# Patient Record
Sex: FEMALE | Race: WHITE | Employment: FULL TIME | URBAN - METROPOLITAN AREA
[De-identification: names, ages, dates, MRNs, and addresses within clinical notes are randomized per-mention and may not be internally consistent; named-entity substitution may affect disease eponyms.]

---
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
## 2020-12-08 ENCOUNTER — HOSPITAL ENCOUNTER (OUTPATIENT)
Age: 20
Setting detail: SPECIMEN
Discharge: HOME | End: 2020-12-08
Payer: COMMERCIAL

## 2020-12-10 LAB
CULTURE: ABNORMAL
Lab: ABNORMAL
SPECIMEN DESCRIPTION: ABNORMAL

## 2021-04-19 ENCOUNTER — TELEPHONE (OUTPATIENT)
Dept: FAMILY MEDICINE CLINIC | Age: 21
End: 2021-04-19

## 2021-04-19 ENCOUNTER — OFFICE VISIT (OUTPATIENT)
Dept: FAMILY MEDICINE CLINIC | Age: 21
End: 2021-04-19
Payer: COMMERCIAL

## 2021-04-19 VITALS
SYSTOLIC BLOOD PRESSURE: 100 MMHG | BODY MASS INDEX: 21.26 KG/M2 | OXYGEN SATURATION: 99 % | HEIGHT: 61 IN | RESPIRATION RATE: 16 BRPM | HEART RATE: 87 BPM | TEMPERATURE: 97.7 F | WEIGHT: 112.6 LBS | DIASTOLIC BLOOD PRESSURE: 60 MMHG

## 2021-04-19 DIAGNOSIS — O21.9 NAUSEA AND VOMITING DURING PREGNANCY: ICD-10-CM

## 2021-04-19 DIAGNOSIS — F41.9 ANXIETY: ICD-10-CM

## 2021-04-19 DIAGNOSIS — O21.9 NAUSEA AND VOMITING DURING PREGNANCY: Primary | ICD-10-CM

## 2021-04-19 DIAGNOSIS — Z3A.01 LESS THAN 8 WEEKS GESTATION OF PREGNANCY: ICD-10-CM

## 2021-04-19 PROCEDURE — G8420 CALC BMI NORM PARAMETERS: HCPCS | Performed by: NURSE PRACTITIONER

## 2021-04-19 PROCEDURE — 1036F TOBACCO NON-USER: CPT | Performed by: NURSE PRACTITIONER

## 2021-04-19 PROCEDURE — G8427 DOCREV CUR MEDS BY ELIG CLIN: HCPCS | Performed by: NURSE PRACTITIONER

## 2021-04-19 PROCEDURE — 99202 OFFICE O/P NEW SF 15 MIN: CPT | Performed by: NURSE PRACTITIONER

## 2021-04-19 PROCEDURE — 99204 OFFICE O/P NEW MOD 45 MIN: CPT | Performed by: NURSE PRACTITIONER

## 2021-04-19 RX ORDER — NITROFURANTOIN 25; 75 MG/1; MG/1
CAPSULE ORAL
COMMUNITY
Start: 2021-04-09

## 2021-04-19 RX ORDER — PNV,CALCIUM 72/IRON/FOLIC ACID 27 MG-1 MG
TABLET ORAL
COMMUNITY
End: 2022-07-25 | Stop reason: ALTCHOICE

## 2021-04-19 RX ORDER — DOXYLAMINE SUCCINATE AND PYRIDOXINE HYDROCHLORIDE, DELAYED RELEASE TABLETS 10 MG/10 MG 10; 10 MG/1; MG/1
TABLET, DELAYED RELEASE ORAL
Qty: 60 TABLET | Refills: 3 | Status: SHIPPED | OUTPATIENT
Start: 2021-04-19 | End: 2021-04-19 | Stop reason: DRUGHIGH

## 2021-04-19 RX ORDER — ONDANSETRON 4 MG/1
4 TABLET, ORALLY DISINTEGRATING ORAL EVERY 8 HOURS PRN
COMMUNITY
End: 2022-07-25 | Stop reason: ALTCHOICE

## 2021-04-19 RX ORDER — DOXYLAMINE SUCCINATE AND PYRIDOXINE HYDROCHLORIDE, DELAYED RELEASE TABLETS 10 MG/10 MG 10; 10 MG/1; MG/1
TABLET, DELAYED RELEASE ORAL
Qty: 60 TABLET | Refills: 3 | Status: SHIPPED | OUTPATIENT
Start: 2021-04-19 | End: 2022-07-25

## 2021-04-19 RX ORDER — CITALOPRAM HYDROBROMIDE 20 MG/10ML
10 SOLUTION, ORAL ORAL DAILY
COMMUNITY
End: 2022-07-25 | Stop reason: ALTCHOICE

## 2021-04-19 SDOH — HEALTH STABILITY: MENTAL HEALTH: HOW MANY STANDARD DRINKS CONTAINING ALCOHOL DO YOU HAVE ON A TYPICAL DAY?: NOT ASKED

## 2021-04-19 SDOH — HEALTH STABILITY: MENTAL HEALTH: HOW OFTEN DO YOU HAVE A DRINK CONTAINING ALCOHOL?: NEVER

## 2021-04-19 ASSESSMENT — PATIENT HEALTH QUESTIONNAIRE - PHQ9
SUM OF ALL RESPONSES TO PHQ QUESTIONS 1-9: 1
SUM OF ALL RESPONSES TO PHQ QUESTIONS 1-9: 1
1. LITTLE INTEREST OR PLEASURE IN DOING THINGS: 1

## 2021-04-19 ASSESSMENT — ENCOUNTER SYMPTOMS
SHORTNESS OF BREATH: 0
COUGH: 0
ABDOMINAL PAIN: 1
VOMITING: 1
NAUSEA: 1

## 2021-04-19 NOTE — PROGRESS NOTES
Apolonia Leger (:  2000) is a 21 y.o. female,New patient, here for evaluation of the following chief complaint(s):  Established New Doctor      ASSESSMENT/PLAN:  1. Nausea and vomiting during pregnancy  - Diclegis sent in. If not covered by insurance I gave her OTC information  2. Less than 8 weeks gestation of pregnancy  - Patient will follow up with OB as scheduled. Is severe abdominal pain develops go to ER  3. Anxiety  - Patient would like to wait on medication at this time      Return if symptoms worsen or fail to improve. SUBJECTIVE/OBJECTIVE:  HPI  Patient is here today as a new patient.  She is newly pregnant at 6.5 weeks. She has been to the ER due to hyperemesis x 2. She also went to the ER 21 because she had a temperature of 100 and she could not keep anything down. She has been taking Ondansetron every 6 hours. She states if she does not take it she continues to vomit. She has been trying to use mints as well. She was also given Phenergan at one point but has not been taking it due to concern of becoming addicted to it. She was previously on Celexa for anxiety and depression which she stopped taking when she found out she was pregnant. Patient states that she had not been on it very long. She states currently she is feeling okay. No thoughts of hurting herself or anyone else. She also states she has seasonal allergies. She would previously take Benadryl if needed. She does have some itchy eyes and burning occasionally. She does have a runny nose and slight cough when she needs to clear her throat. Review of Systems   Constitutional: Negative. HENT: Negative. Respiratory: Negative for cough and shortness of breath. Cardiovascular: Negative for chest pain. Gastrointestinal: Positive for abdominal pain, nausea and vomiting. Patient states she has had abdominal pain for the last 2-3 days which is sharp stabbing. It is not constant.   It comes and goes. Patient was told at one ER she has a pregnancy in the uterus and a pregnancy could not be ruled out in a tube (she is not aware which tube). Genitourinary:        Patient states she is 6.5 weeks pregnant. She is scheduled to see Dr. Becca Almonte 4/22/21. She has been to the ER x 3 since finding out she was pregnant. Was told she had a UTI at the ER 10 days ago so she is on Macrobid. She denies any urinary symptoms. Skin: Negative for rash. Neurological: Negative. Psychiatric/Behavioral: Negative for suicidal ideas. The patient is nervous/anxious. Physical Exam  Vitals signs and nursing note reviewed. Constitutional:       Appearance: Normal appearance. She is normal weight. HENT:      Head: Normocephalic and atraumatic. Mouth/Throat:      Pharynx: Oropharynx is clear. Eyes:      Conjunctiva/sclera: Conjunctivae normal.   Cardiovascular:      Rate and Rhythm: Normal rate. Pulmonary:      Effort: Pulmonary effort is normal.      Breath sounds: Normal breath sounds. Abdominal:      General: Abdomen is flat. Bowel sounds are normal.      Palpations: Abdomen is soft. Tenderness: There is abdominal tenderness in the suprapubic area. Neurological:      General: No focal deficit present. Mental Status: She is alert and oriented to person, place, and time. Mental status is at baseline. Psychiatric:         Mood and Affect: Mood normal.         Behavior: Behavior normal.         Thought Content: Thought content normal.         Judgment: Judgment normal.           On this date 4/19/2021 I have spent 35 minutes reviewing previous notes, test results and face to face with the patient discussing the diagnosis and importance of compliance with the treatment plan as well as documenting on the day of the visit. An electronic signature was used to authenticate this note.     --Gina Hull, APRN - CNP

## 2021-04-19 NOTE — PATIENT INSTRUCTIONS
These can increase your chances of quitting for good. · If you are taking iron supplements, ask your doctor if they are necessary. Iron can make nausea worse. · Get lots of rest. Stress and fatigue can make your morning sickness worse. · Ask your doctor about taking prescription medicine, or over-the-counter products such as vitamin B6, doxylamine, or erick, to relieve your symptoms. Your doctor can tell you the doses that are safe for you. · Take your prenatal vitamins at night on a full stomach. When should you call for help? Call 911 anytime you think you may need emergency care. For example, call if:    · You passed out (lost consciousness). Call your doctor now or seek immediate medical care if:    · You are sick to your stomach or cannot drink fluids.     · You have symptoms of dehydration, such as:  ? Dry eyes and a dry mouth. ? Passing only a little urine. ? Feeling thirstier than usual.     · You are not able to keep down your medicine.     · You have pain in your belly or pelvis. Watch closely for changes in your health, and be sure to contact your doctor if:    · You do not get better as expected. Where can you learn more? Go to https://HoverWindpepiceweb.ZoomInfo. org and sign in to your Verimatrix account. Enter M892 in the [employer] box to learn more about \"Managing Morning Sickness: Care Instructions. \"     If you do not have an account, please click on the \"Sign Up Now\" link. Current as of: October 8, 2020               Content Version: 12.8  © 2006-2021 Healthwise, Incorporated. Care instructions adapted under license by [facility] ([facility]). If you have questions about a medical condition or this instruction, always ask your healthcare professional. [place] [address] any warranty or liability for your use of this information.          Patient Education        Suspected Ectopic Pregnancy: Care Instructions  Your Care Instructions     Your doctor thinks you may have an ectopic pregnancy. This means that a fertilized egg has attached to a place outside the uterus. In most of these cases, the egg grows in a fallopian tube. This is also called a tubal pregnancy. In rare cases, the egg grows in an ovary or another place in the belly. An ectopic pregnancy cannot develop normally. It can be painful and very dangerous. In some cases, the ectopic pregnancy ends and the body absorbs it over time. If so, treatment is not needed. Your doctor is sending you home to watch for belly pain or bleeding. Call your doctor right away if you have any new or increased pain or bleeding. If you have other symptoms, such as shoulder pain, dizziness, lightheadedness, or fainting, call your doctor right away. These could be signs of internal bleeding. You also may need to come back for more tests. If an ectopic pregnancy does not end on its own, the only treatment is medicine or surgery to end the pregnancy. An ectopic pregnancy can be very upsetting. But you should not blame yourself. You could not have done anything to prevent it. The doctor has checked you carefully. But problems can develop later. If you notice any problems or new symptoms, get medical treatment right away. Follow-up care is a key part of your treatment and safety. Be sure to make and go to all appointments, and call your doctor if you are having problems. It's also a good idea to know your test results and keep a list of the medicines you take. How can you care for yourself at home? · Rest when you feel tired. You may be more tired than normal for a few weeks. · If you are treated with methotrexate:  ? Your doctor will let you know if you can take over-the-counter pain medicine, such as acetaminophen (Tylenol), ibuprofen (Advil, Motrin), or naproxen (Aleve). Read and follow all instructions on the label. ? Do not take two or more pain medicines at the same time unless the doctor told you to.  Many pain medicines have acetaminophen, which is Tylenol. Too much acetaminophen (Tylenol) can be harmful. ? Do not drink alcohol. ? Do not take vitamins that contain folic acid, such as prenatal vitamins. · Use pads instead of tampons until your doctor says it is okay. · It may help to talk with family, friends, or a counselor if you are having trouble dealing with the loss of your pregnancy. If you feel sad or depressed for longer than 2 weeks, talk to a counselor or your doctor. · Do not have sex until your doctor says it is okay. · Talk with your doctor about any future pregnancy plans. When should you call for help? Call 911 anytime you think you may need emergency care. For example, call if:    · You have sudden, severe pain in your belly or pelvis.     · You passed out (lost consciousness).     · You have severe vaginal bleeding. Call your doctor now or seek immediate medical care if:    · You are dizzy or lightheaded, or you feel like you may faint.     · You have new or increased pain in your belly or pelvis.     · Your vaginal bleeding is getting worse.     · You have increased pain in the vaginal area.     · You have new pain in your shoulder.     · You have a fever. Watch closely for changes in your health, and be sure to contact your doctor if:    · You have new or worse vaginal discharge.     · You do not get better as expected. Where can you learn more? Go to https://chpepiceweb.[email or web address] Digital [email or web address] org and sign in to your Goomeo account. Enter I526 in the [facility] box to learn more about \"Suspected Ectopic Pregnancy: Care Instructions. \"     If you do not have an account, please click on the \"Sign Up Now\" link. Current as of: October 8, 2020               Content Version: 12.8  © 8939-9571 Healthwise, eBoox. Care instructions adapted under license by [facility] ([facility]).  If you have questions about a medical condition or this instruction, always ask your healthcare professional. Selena Goncalves, Incorporated disclaims any warranty or liability for your use of this information. Patient Education        Learning About Pregnancy  Your Care Instructions     Your health in the early weeks of your pregnancy is particularly important for your baby's health. Take good care of yourself. Anything you do that harms your body can also harm your baby. Make sure to go to all of your doctor appointments. Regular checkups will help keep you and your baby healthy. How can you care for yourself at home? Diet    · Eat a balanced diet. Make sure your diet includes plenty of beans, peas, and leafy green vegetables.     · Do not skip meals or go for many hours without eating. If you are nauseated, try to eat a small, healthy snack every 2 to 3 hours.     · Do not eat fish that has a high level of mercury, such as shark, swordfish, or mackerel. Do not eat more than one can of tuna each week.     · Drink plenty of fluids. If you have kidney, heart, or liver disease and have to limit fluids, talk with your doctor before you increase the amount of fluids you drink.     · Cut down on caffeine, such as coffee, tea, and cola.     · Do not drink alcohol, such as beer, wine, or hard liquor.     · Take a multivitamin that contains at least 400 micrograms (mcg) of folic acid to help prevent birth defects. Fortified cereal and whole wheat bread are good additional sources of folic acid.     · Increase the calcium in your diet. Try to drink a quart of skim milk each day. You may also take calcium supplements and choose foods such as cheese and yogurt. Lifestyle    · Make sure you go to your follow-up appointments.     · Get plenty of rest. You may be unusually tired while you are pregnant.     · Get at least 30 minutes of exercise on most days of the week. Walking is a good choice. If you have not exercised in the past, start out slowly. Take several short walks each day.     · Do not smoke.  If you need help quitting, talk to your doctor about stop-smoking programs. These can increase your chances of quitting for good.     · Do not touch cat feces or litter boxes. Also, wash your hands after you handle raw meat, and fully cook all meat before you eat it. Wear gloves when you work in the yard or garden, and wash your hands well when you are done. Cat feces, raw or undercooked meat, and contaminated dirt can cause an infection that may harm your baby or lead to a miscarriage.     · Do not use saunas or hot tubs. Raising your body temperature may harm your baby.     · Avoid chemical fumes, paint fumes, or poisons.     · Do not use illegal drugs, marijuana, or alcohol. Medicines    · Review all of your medicines with your doctor. Some of your routine medicines may need to be changed to protect your baby.     · Use acetaminophen (Tylenol) to relieve minor problems, such as a mild headache or backache or a mild fever with cold symptoms. Do not use nonsteroidal anti-inflammatory drugs (NSAIDs), such as ibuprofen (Advil, Motrin) or naproxen (Aleve), unless your doctor says it is okay.     · Do not take two or more pain medicines at the same time unless the doctor told you to. Many pain medicines have acetaminophen, which is Tylenol. Too much acetaminophen (Tylenol) can be harmful.     · Take your medicines exactly as prescribed. Call your doctor if you think you are having a problem with your medicine. To manage morning sickness    · If you feel sick when you first wake up, try eating a small snack (such as crackers) before you get out of bed. Allow some time to digest the snack, and then get out of bed slowly.     · Do not skip meals or go for long periods without eating.  An empty stomach can make nausea worse.     · Eat small, frequent meals instead of three large meals each day.     · Drink plenty of fluids.     · Eat foods that are high in protein but low in fat.     · If you are taking iron supplements, ask your doctor if they are necessary. Iron can make nausea worse.     · Avoid any smells, such as coffee, that make you feel sick.     · Get lots of rest. Morning sickness may be worse when you are tired. Follow-up care is a key part of your treatment and safety. Be sure to make and go to all appointments, and call your doctor if you are having problems. It's also a good idea to know your test results and keep a list of the medicines you take. Where can you learn more? Go to https://CloudianpeCazoodle.BakedCode. org and sign in to your [employer] account. Enter S675 in the [employer] box to learn more about \"Learning About Pregnancy. \"     If you do not have an account, please click on the \"Sign Up Now\" link. Current as of: October 8, 2020               Content Version: 12.8  © 6455-5972 HealthAlcove, Incorporated. Care instructions adapted under license by [facility] ([facility]). If you have questions about a medical condition or this instruction, always ask your healthcare professional. [place] [address] any warranty or liability for your use of this information.

## 2021-04-22 LAB
ABO, EXTERNAL RESULT: NORMAL
C. TRACHOMATIS, EXTERNAL RESULT: NEGATIVE
HEP B, EXTERNAL RESULT: NO NREACTIVE
HIV, EXTERNAL RESULT: NON REACTIVE
RPR, EXTERNAL RESULT: NON REACTIVE
RUBELLA TITER, EXTERNAL RESULT: 13.3

## 2021-09-01 ENCOUNTER — INITIAL PRENATAL (OUTPATIENT)
Dept: OBGYN | Age: 21
End: 2021-09-01
Payer: COMMERCIAL

## 2021-09-01 ENCOUNTER — HOSPITAL ENCOUNTER (OUTPATIENT)
Dept: LAB | Age: 21
Discharge: HOME | End: 2021-09-01
Payer: COMMERCIAL

## 2021-09-01 VITALS — HEART RATE: 96 BPM | WEIGHT: 135 LBS | BODY MASS INDEX: 25.51 KG/M2

## 2021-09-01 DIAGNOSIS — Z3A.27 27 WEEKS GESTATION OF PREGNANCY: ICD-10-CM

## 2021-09-01 DIAGNOSIS — Z34.92 PRENATAL CARE, SECOND TRIMESTER: Primary | ICD-10-CM

## 2021-09-01 DIAGNOSIS — Z34.92 PRENATAL CARE, SECOND TRIMESTER: ICD-10-CM

## 2021-09-01 LAB
ABSOLUTE EOS #: 0.09 K/UL (ref 0–0.44)
ABSOLUTE IMMATURE GRANULOCYTE: 0.04 K/UL (ref 0–0.3)
ABSOLUTE LYMPH #: 1.58 K/UL (ref 1.1–3.7)
ABSOLUTE MONO #: 0.54 K/UL (ref 0.1–1.4)
BASOPHILS # BLD: 1 % (ref 0–2)
BASOPHILS ABSOLUTE: 0.05 K/UL (ref 0–0.2)
DIFFERENTIAL TYPE: ABNORMAL
EOSINOPHILS RELATIVE PERCENT: 1 % (ref 1–4)
GLUCOSE ADMINISTRATION: NORMAL
GLUCOSE TOLERANCE SCREEN 50G: 106 MG/DL (ref 70–135)
HCT VFR BLD CALC: 32.3 % (ref 36.3–47.1)
HEMOGLOBIN: 10.7 G/DL (ref 11.9–15.1)
IMMATURE GRANULOCYTES: 0 %
LYMPHOCYTES # BLD: 17 % (ref 25–45)
MCH RBC QN AUTO: 30 PG (ref 25.2–33.5)
MCHC RBC AUTO-ENTMCNC: 33.1 G/DL (ref 25.2–33.5)
MCV RBC AUTO: 90.5 FL (ref 82.6–102.9)
MONOCYTES # BLD: 6 % (ref 2–8)
NRBC AUTOMATED: 0 PER 100 WBC
PDW BLD-RTO: 12.2 % (ref 11.8–14.4)
PLATELET # BLD: 240 K/UL (ref 138–453)
PLATELET ESTIMATE: ABNORMAL
PMV BLD AUTO: 11.5 FL (ref 8.1–13.5)
RBC # BLD: 3.57 M/UL (ref 3.95–5.11)
RBC # BLD: ABNORMAL 10*6/UL
SEG NEUTROPHILS: 75 % (ref 34–64)
SEGMENTED NEUTROPHILS ABSOLUTE COUNT: 6.91 K/UL (ref 1.5–8.1)
WBC # BLD: 9.2 K/UL (ref 4.5–13.5)
WBC # BLD: ABNORMAL 10*3/UL

## 2021-09-01 PROCEDURE — 99214 OFFICE O/P EST MOD 30 MIN: CPT | Performed by: ADVANCED PRACTICE MIDWIFE

## 2021-09-01 PROCEDURE — 85025 COMPLETE CBC W/AUTO DIFF WBC: CPT

## 2021-09-01 PROCEDURE — 36415 COLL VENOUS BLD VENIPUNCTURE: CPT

## 2021-09-01 PROCEDURE — 82950 GLUCOSE TEST: CPT

## 2021-09-01 PROCEDURE — 90715 TDAP VACCINE 7 YRS/> IM: CPT | Performed by: ADVANCED PRACTICE MIDWIFE

## 2021-09-01 RX ORDER — FERROUS SULFATE 325(65) MG
TABLET ORAL
COMMUNITY
Start: 2021-05-06

## 2021-09-01 NOTE — PROGRESS NOTES
S:  Presents as a transfer of care. Reports has not been seen in the past 4 weeks. Desired CNM care. O:  MVSS, Afebrile. Abdomen gravid, cephalic and nontender. +FHT's, +FM. A:  23 yo  at 27 Weeks 0 Days SIUP, Late Transfer of Care, +FHT's  P:  28 week labs, Tdap today, growth US, obtain prenatal records. Ds'd and reviewed diet, hydration, safe meds, danger S&S, when to call. PNV daily. RTO 2 weeks. Thirty minutes spent in education, evaluation and assessment.

## 2021-09-02 ENCOUNTER — HOSPITAL ENCOUNTER (OUTPATIENT)
Dept: ULTRASOUND IMAGING | Age: 21
Discharge: HOME | End: 2021-09-04
Payer: COMMERCIAL

## 2021-09-02 ENCOUNTER — ROUTINE PRENATAL (OUTPATIENT)
Dept: OBGYN | Age: 21
End: 2021-09-02
Payer: COMMERCIAL

## 2021-09-02 ENCOUNTER — HOSPITAL ENCOUNTER (OUTPATIENT)
Age: 21
Setting detail: SPECIMEN
Discharge: HOME | End: 2021-09-02
Payer: COMMERCIAL

## 2021-09-02 VITALS
HEART RATE: 87 BPM | WEIGHT: 136 LBS | DIASTOLIC BLOOD PRESSURE: 60 MMHG | BODY MASS INDEX: 25.7 KG/M2 | SYSTOLIC BLOOD PRESSURE: 110 MMHG

## 2021-09-02 DIAGNOSIS — O99.012 ANEMIA DURING PREGNANCY IN SECOND TRIMESTER: ICD-10-CM

## 2021-09-02 DIAGNOSIS — Z3A.27 27 WEEKS GESTATION OF PREGNANCY: ICD-10-CM

## 2021-09-02 DIAGNOSIS — Z34.92 PRENATAL CARE, SECOND TRIMESTER: ICD-10-CM

## 2021-09-02 DIAGNOSIS — O99.019 ANTEPARTUM ANEMIA: ICD-10-CM

## 2021-09-02 DIAGNOSIS — Z34.92 PRENATAL CARE, SECOND TRIMESTER: Primary | ICD-10-CM

## 2021-09-02 LAB
-: ABNORMAL
AMORPHOUS: ABNORMAL
AMPHETAMINE SCREEN URINE: NEGATIVE
BACTERIA: ABNORMAL
BARBITURATE SCREEN URINE: NEGATIVE
BENZODIAZEPINE SCREEN, URINE: NEGATIVE
BILIRUBIN URINE: NEGATIVE
BUPRENORPHINE URINE: NEGATIVE
CANNABINOID SCREEN URINE: NEGATIVE
CASTS UA: ABNORMAL /LPF (ref 0–2)
COCAINE METABOLITE, URINE: NEGATIVE
COLOR: ABNORMAL
COMMENT UA: ABNORMAL
CRYSTALS, UA: ABNORMAL /HPF
EPITHELIAL CELLS UA: ABNORMAL /HPF (ref 0–5)
GLUCOSE URINE: NEGATIVE
KETONES, URINE: NEGATIVE
LEUKOCYTE ESTERASE, URINE: ABNORMAL
MDMA URINE: NORMAL
METHADONE SCREEN, URINE: NEGATIVE
METHAMPHETAMINE, URINE: NEGATIVE
MUCUS: ABNORMAL
NITRITE, URINE: NEGATIVE
OPIATES, URINE: NEGATIVE
OTHER OBSERVATIONS UA: ABNORMAL
OXYCODONE SCREEN URINE: NEGATIVE
PH UA: 7 (ref 5–6)
PHENCYCLIDINE, URINE: NEGATIVE
PROPOXYPHENE, URINE: NEGATIVE
PROTEIN UA: NEGATIVE
RBC UA: ABNORMAL /HPF (ref 0–4)
RENAL EPITHELIAL, UA: ABNORMAL /HPF
SPECIFIC GRAVITY UA: 1.01 (ref 1.01–1.02)
TEST INFORMATION: NORMAL
TRICHOMONAS: ABNORMAL
TRICYCLIC ANTIDEPRESSANTS, UR: NEGATIVE
TURBIDITY: ABNORMAL
URINE HGB: NEGATIVE
UROBILINOGEN, URINE: NORMAL
WBC UA: ABNORMAL /HPF (ref 0–4)
YEAST: ABNORMAL

## 2021-09-02 PROCEDURE — 1036F TOBACCO NON-USER: CPT | Performed by: ADVANCED PRACTICE MIDWIFE

## 2021-09-02 PROCEDURE — 99213 OFFICE O/P EST LOW 20 MIN: CPT | Performed by: ADVANCED PRACTICE MIDWIFE

## 2021-09-02 PROCEDURE — 80306 DRUG TEST PRSMV INSTRMNT: CPT

## 2021-09-02 PROCEDURE — 76805 OB US >/= 14 WKS SNGL FETUS: CPT

## 2021-09-02 PROCEDURE — G8419 CALC BMI OUT NRM PARAM NOF/U: HCPCS | Performed by: ADVANCED PRACTICE MIDWIFE

## 2021-09-02 PROCEDURE — 87086 URINE CULTURE/COLONY COUNT: CPT

## 2021-09-02 PROCEDURE — 99211 OFF/OP EST MAY X REQ PHY/QHP: CPT | Performed by: ADVANCED PRACTICE MIDWIFE

## 2021-09-02 PROCEDURE — G8427 DOCREV CUR MEDS BY ELIG CLIN: HCPCS | Performed by: ADVANCED PRACTICE MIDWIFE

## 2021-09-02 PROCEDURE — 81001 URINALYSIS AUTO W/SCOPE: CPT

## 2021-09-02 RX ORDER — ASCORBIC ACID 250 MG
250 TABLET ORAL NIGHTLY
Qty: 30 TABLET | Refills: 2 | Status: SHIPPED | OUTPATIENT
Start: 2021-09-02 | End: 2022-02-28

## 2021-09-03 LAB
CULTURE: NO GROWTH
Lab: NORMAL
SPECIMEN DESCRIPTION: NORMAL

## 2021-09-16 ENCOUNTER — ROUTINE PRENATAL (OUTPATIENT)
Dept: OBGYN | Age: 21
End: 2021-09-16
Payer: COMMERCIAL

## 2021-09-16 VITALS
BODY MASS INDEX: 26 KG/M2 | WEIGHT: 137.6 LBS | SYSTOLIC BLOOD PRESSURE: 108 MMHG | DIASTOLIC BLOOD PRESSURE: 58 MMHG | HEART RATE: 82 BPM

## 2021-09-16 DIAGNOSIS — Z34.93 THIRD TRIMESTER PREGNANCY: Primary | ICD-10-CM

## 2021-09-16 DIAGNOSIS — Z3A.29 29 WEEKS GESTATION OF PREGNANCY: ICD-10-CM

## 2021-09-16 DIAGNOSIS — O99.012 ANEMIA DURING PREGNANCY IN SECOND TRIMESTER: ICD-10-CM

## 2021-09-16 PROCEDURE — 99214 OFFICE O/P EST MOD 30 MIN: CPT | Performed by: ADVANCED PRACTICE MIDWIFE

## 2021-09-16 PROCEDURE — G8419 CALC BMI OUT NRM PARAM NOF/U: HCPCS | Performed by: ADVANCED PRACTICE MIDWIFE

## 2021-09-16 PROCEDURE — G8427 DOCREV CUR MEDS BY ELIG CLIN: HCPCS | Performed by: ADVANCED PRACTICE MIDWIFE

## 2021-09-16 PROCEDURE — 1036F TOBACCO NON-USER: CPT | Performed by: ADVANCED PRACTICE MIDWIFE

## 2021-09-16 NOTE — PROGRESS NOTES
S:  Presents for prenatal visit today. Reports baby is active and occasional BH contractions. Reports feeling well. O:  MVSS, Afebrile. Abdomen gravid, cephalic and nontender. +FHT's, +FM  A:  23 yo  at 29 Weeks 1 Day SIUP, +FHT's  P:  Education: discussed FMI, PTL, hydration, danger S&S, when to call. RTO 2 weeks. Thirty minutes spent in education, evaluation and assessment.

## 2021-09-21 ENCOUNTER — TELEPHONE (OUTPATIENT)
Dept: OBGYN | Age: 21
End: 2021-09-21

## 2021-09-21 NOTE — TELEPHONE ENCOUNTER
Patient called reporting she tested positive for covid yesterday at [employer]    Monoclonal antibody- her boss told her she qualifies for this- please advise    Patient was given vitamin recommendations of  Vitamin C 500mg daily  B complex daily  Vitamin D3 2000IU daily    Patient encouraged to stay well hydrated and to monitor symptoms- any SOB or worsening sx report to care facility for treatment

## 2021-09-22 ENCOUNTER — TELEPHONE (OUTPATIENT)
Dept: OBGYN | Age: 21
End: 2021-09-22

## 2021-09-22 NOTE — TELEPHONE ENCOUNTER
Patient notified of scheduled time and date of Regeneron infusion. Patient scheduled for 2:00 pm on 09/23/2021 at ER. Advised to come to ER and wait in car. Upon arrival call # [phone]. Advised to wait in a car until a nurse comes out to get her. Fact Sheet for Emergency use of Casirivimab and Imdevimab read to the patient. Patient denied questions regarding treatment and still consents to treatment. Patient advised to plan to be at facility for at least 2 hours day of the infusion. Patient voiced understanding.  Form faxed to [facility] pharmacy, and U

## 2021-09-23 ENCOUNTER — HOSPITAL ENCOUNTER (OUTPATIENT)
Dept: INFUSION THERAPY | Age: 21
Setting detail: INFUSION SERIES
Discharge: HOME | End: 2021-09-23
Payer: COMMERCIAL

## 2021-09-23 VITALS
DIASTOLIC BLOOD PRESSURE: 49 MMHG | HEART RATE: 84 BPM | SYSTOLIC BLOOD PRESSURE: 110 MMHG | OXYGEN SATURATION: 97 % | RESPIRATION RATE: 14 BRPM | TEMPERATURE: 98.3 F

## 2021-09-23 DIAGNOSIS — O98.513 COVID-19 AFFECTING PREGNANCY IN THIRD TRIMESTER: Primary | ICD-10-CM

## 2021-09-23 DIAGNOSIS — U07.1 COVID-19 AFFECTING PREGNANCY IN THIRD TRIMESTER: Primary | ICD-10-CM

## 2021-09-23 PROCEDURE — 2580000003 HC RX 258: Performed by: ADVANCED PRACTICE MIDWIFE

## 2021-09-23 PROCEDURE — 6360000002 HC RX W HCPCS: Performed by: ADVANCED PRACTICE MIDWIFE

## 2021-09-23 RX ORDER — SODIUM CHLORIDE 9 MG/ML
INJECTION, SOLUTION INTRAVENOUS CONTINUOUS
OUTPATIENT
Start: 2021-09-23

## 2021-09-23 RX ORDER — SODIUM CHLORIDE 9 MG/ML
INJECTION, SOLUTION INTRAVENOUS CONTINUOUS
Status: CANCELLED | OUTPATIENT
Start: 2021-09-23

## 2021-09-23 RX ORDER — EPINEPHRINE 1 MG/ML
0.3 INJECTION, SOLUTION, CONCENTRATE INTRAVENOUS PRN
OUTPATIENT
Start: 2021-09-23

## 2021-09-23 RX ORDER — SODIUM CHLORIDE 0.9 % (FLUSH) 0.9 %
5-40 SYRINGE (ML) INJECTION PRN
OUTPATIENT
Start: 2021-09-23

## 2021-09-23 RX ORDER — SODIUM CHLORIDE 0.9 % (FLUSH) 0.9 %
5-40 SYRINGE (ML) INJECTION PRN
Status: CANCELLED | OUTPATIENT
Start: 2021-09-23

## 2021-09-23 RX ORDER — DIPHENHYDRAMINE HYDROCHLORIDE 50 MG/ML
50 INJECTION INTRAMUSCULAR; INTRAVENOUS ONCE
OUTPATIENT
Start: 2021-09-23 | End: 2021-09-23

## 2021-09-23 RX ORDER — SODIUM CHLORIDE 9 MG/ML
25 INJECTION, SOLUTION INTRAVENOUS PRN
Status: CANCELLED | OUTPATIENT
Start: 2021-09-23

## 2021-09-23 RX ORDER — METHYLPREDNISOLONE SODIUM SUCCINATE 125 MG/2ML
125 INJECTION, POWDER, LYOPHILIZED, FOR SOLUTION INTRAMUSCULAR; INTRAVENOUS ONCE
OUTPATIENT
Start: 2021-09-23 | End: 2021-09-23

## 2021-09-23 RX ORDER — DIPHENHYDRAMINE HYDROCHLORIDE 50 MG/ML
50 INJECTION INTRAMUSCULAR; INTRAVENOUS ONCE
Status: CANCELLED | OUTPATIENT
Start: 2021-09-23 | End: 2021-09-23

## 2021-09-23 RX ORDER — EPINEPHRINE 1 MG/ML
0.3 INJECTION, SOLUTION, CONCENTRATE INTRAVENOUS PRN
Status: CANCELLED | OUTPATIENT
Start: 2021-09-23

## 2021-09-23 RX ORDER — SODIUM CHLORIDE 9 MG/ML
25 INJECTION, SOLUTION INTRAVENOUS PRN
OUTPATIENT
Start: 2021-09-23

## 2021-09-23 RX ORDER — HEPARIN SODIUM (PORCINE) LOCK FLUSH IV SOLN 100 UNIT/ML 100 UNIT/ML
500 SOLUTION INTRAVENOUS PRN
Status: CANCELLED | OUTPATIENT
Start: 2021-09-23

## 2021-09-23 RX ORDER — HEPARIN SODIUM (PORCINE) LOCK FLUSH IV SOLN 100 UNIT/ML 100 UNIT/ML
500 SOLUTION INTRAVENOUS PRN
OUTPATIENT
Start: 2021-09-23

## 2021-09-23 RX ORDER — METHYLPREDNISOLONE SODIUM SUCCINATE 125 MG/2ML
125 INJECTION, POWDER, LYOPHILIZED, FOR SOLUTION INTRAMUSCULAR; INTRAVENOUS ONCE
Status: CANCELLED | OUTPATIENT
Start: 2021-09-23 | End: 2021-09-23

## 2021-09-23 RX ADMIN — CASIRIVIMAB AND IMDEVIMAB: 600; 600 INJECTION, SOLUTION, CONCENTRATE INTRAVENOUS at 14:42

## 2021-10-04 ENCOUNTER — ROUTINE PRENATAL (OUTPATIENT)
Dept: OBGYN | Age: 21
End: 2021-10-04
Payer: COMMERCIAL

## 2021-10-04 VITALS
DIASTOLIC BLOOD PRESSURE: 60 MMHG | WEIGHT: 146.6 LBS | HEART RATE: 102 BPM | BODY MASS INDEX: 27.7 KG/M2 | SYSTOLIC BLOOD PRESSURE: 120 MMHG

## 2021-10-04 DIAGNOSIS — O99.012 ANEMIA DURING PREGNANCY IN SECOND TRIMESTER: ICD-10-CM

## 2021-10-04 DIAGNOSIS — Z3A.31 31 WEEKS GESTATION OF PREGNANCY: ICD-10-CM

## 2021-10-04 DIAGNOSIS — Z34.93 PRENATAL CARE, THIRD TRIMESTER: Primary | ICD-10-CM

## 2021-10-04 PROCEDURE — G8484 FLU IMMUNIZE NO ADMIN: HCPCS | Performed by: ADVANCED PRACTICE MIDWIFE

## 2021-10-04 PROCEDURE — G8419 CALC BMI OUT NRM PARAM NOF/U: HCPCS | Performed by: ADVANCED PRACTICE MIDWIFE

## 2021-10-04 PROCEDURE — 99214 OFFICE O/P EST MOD 30 MIN: CPT | Performed by: ADVANCED PRACTICE MIDWIFE

## 2021-10-04 PROCEDURE — G8427 DOCREV CUR MEDS BY ELIG CLIN: HCPCS | Performed by: ADVANCED PRACTICE MIDWIFE

## 2021-10-04 PROCEDURE — 1036F TOBACCO NON-USER: CPT | Performed by: ADVANCED PRACTICE MIDWIFE

## 2021-10-04 PROCEDURE — 99211 OFF/OP EST MAY X REQ PHY/QHP: CPT | Performed by: ADVANCED PRACTICE MIDWIFE

## 2021-10-04 RX ORDER — ACETAMINOPHEN 160 MG
TABLET,DISINTEGRATING ORAL
COMMUNITY
End: 2022-07-25

## 2021-10-04 NOTE — PROGRESS NOTES
S:  Presents for prenatal visit today. Reports feeling difficulty breathing, started with Covid-19 diagnosis, tested positive on the .  She reports that her breathing has not fully returned to her normal.   O:  MVSS, Afebrile. Abdomen gravid,cephalic and nontender. +FHT's, +FM  A:  25 yo  at 31 Weeks 5 Days SIUP, +FHT's  P:  Education: peds, baby doctor/provider, labor and delivery, pain management, breastfeeding and pump, order for breast pump given, Thirty minutes spent in education, evaluation and assessment. RTO 2 weeks.

## 2021-10-18 ENCOUNTER — HOSPITAL ENCOUNTER (OUTPATIENT)
Dept: ULTRASOUND IMAGING | Age: 21
Discharge: HOME | End: 2021-10-20
Payer: COMMERCIAL

## 2021-10-18 ENCOUNTER — ROUTINE PRENATAL (OUTPATIENT)
Dept: OBGYN | Age: 21
End: 2021-10-18
Payer: COMMERCIAL

## 2021-10-18 VITALS
BODY MASS INDEX: 27.7 KG/M2 | SYSTOLIC BLOOD PRESSURE: 112 MMHG | WEIGHT: 146.6 LBS | HEART RATE: 84 BPM | DIASTOLIC BLOOD PRESSURE: 66 MMHG

## 2021-10-18 DIAGNOSIS — Z3A.33 33 WEEKS GESTATION OF PREGNANCY: ICD-10-CM

## 2021-10-18 DIAGNOSIS — O99.012 ANEMIA DURING PREGNANCY IN SECOND TRIMESTER: ICD-10-CM

## 2021-10-18 DIAGNOSIS — Z23 FLU VACCINE NEED: ICD-10-CM

## 2021-10-18 DIAGNOSIS — Z34.93 PRENATAL CARE, THIRD TRIMESTER: ICD-10-CM

## 2021-10-18 DIAGNOSIS — Z34.93 PRENATAL CARE, THIRD TRIMESTER: Primary | ICD-10-CM

## 2021-10-18 PROCEDURE — G8482 FLU IMMUNIZE ORDER/ADMIN: HCPCS | Performed by: ADVANCED PRACTICE MIDWIFE

## 2021-10-18 PROCEDURE — 1036F TOBACCO NON-USER: CPT | Performed by: ADVANCED PRACTICE MIDWIFE

## 2021-10-18 PROCEDURE — PBSHW INFLUENZA, QUADV, 3 YRS AND OLDER, IM PF, PREFILL SYR OR SDV, 0.5ML (AFLURIA QUADV, PF): Performed by: ADVANCED PRACTICE MIDWIFE

## 2021-10-18 PROCEDURE — 76805 OB US >/= 14 WKS SNGL FETUS: CPT

## 2021-10-18 PROCEDURE — 90686 IIV4 VACC NO PRSV 0.5 ML IM: CPT | Performed by: ADVANCED PRACTICE MIDWIFE

## 2021-10-18 PROCEDURE — G8419 CALC BMI OUT NRM PARAM NOF/U: HCPCS | Performed by: ADVANCED PRACTICE MIDWIFE

## 2021-10-18 PROCEDURE — G8427 DOCREV CUR MEDS BY ELIG CLIN: HCPCS | Performed by: ADVANCED PRACTICE MIDWIFE

## 2021-10-18 PROCEDURE — 99214 OFFICE O/P EST MOD 30 MIN: CPT | Performed by: ADVANCED PRACTICE MIDWIFE

## 2021-10-18 NOTE — PROGRESS NOTES
Have you had an allergic reaction to the flu (influenza) shot? no  Are you allergic to eggs or any component of the flu vaccine? no  Do you have a history of Guillain-Amma Syndrome (GBS), which is paralysis after receiving the flu vaccine? no  Are you feeling well today? yes  Flu vaccine given as ordered. Patient tolerated it well. No questions re: VIS information.

## 2021-10-18 NOTE — PROGRESS NOTES
S:   Presents for presents for prenatal visit today. Reports reduced constipation with high fiber smoothie. No contractions. Baby is active. Desires flu vaccine. O:  MVSS, Afebrile. Abdomen gravid, cephalic and nontender. S=D, +FHT's, +FM. US = 34 Weeks 2 Days = EDC 2021, EFW 2384 gm. +/- 357.65 gm., (5# 4 oz. +/- 13 oz.), 59.2%, CELIO 15.21 cm.,   A:  23 yo  at 33 Weeks 5 Days SIUP, +FHT's, Anemia in Pregnancy  P:  Growth US today, ds'd flu vaccine, Covid-19 vaccine, FMI, danger S&S, when to call. Reviewed US. Thirty minutes spent in education, evaluation and assessment.

## 2021-11-01 ENCOUNTER — ROUTINE PRENATAL (OUTPATIENT)
Dept: OBGYN | Age: 21
End: 2021-11-01
Payer: COMMERCIAL

## 2021-11-01 ENCOUNTER — HOSPITAL ENCOUNTER (OUTPATIENT)
Age: 21
Setting detail: SPECIMEN
Discharge: HOME | End: 2021-11-01
Payer: COMMERCIAL

## 2021-11-01 VITALS
DIASTOLIC BLOOD PRESSURE: 60 MMHG | HEART RATE: 83 BPM | SYSTOLIC BLOOD PRESSURE: 120 MMHG | BODY MASS INDEX: 28.53 KG/M2 | WEIGHT: 151 LBS

## 2021-11-01 DIAGNOSIS — Z3A.35 35 WEEKS GESTATION OF PREGNANCY: ICD-10-CM

## 2021-11-01 DIAGNOSIS — O99.012 ANEMIA DURING PREGNANCY IN SECOND TRIMESTER: ICD-10-CM

## 2021-11-01 DIAGNOSIS — Z34.93 PRENATAL CARE, THIRD TRIMESTER: Primary | ICD-10-CM

## 2021-11-01 PROCEDURE — 99212 OFFICE O/P EST SF 10 MIN: CPT | Performed by: ADVANCED PRACTICE MIDWIFE

## 2021-11-01 PROCEDURE — 87081 CULTURE SCREEN ONLY: CPT

## 2021-11-01 PROCEDURE — G8427 DOCREV CUR MEDS BY ELIG CLIN: HCPCS | Performed by: ADVANCED PRACTICE MIDWIFE

## 2021-11-01 PROCEDURE — 1036F TOBACCO NON-USER: CPT | Performed by: ADVANCED PRACTICE MIDWIFE

## 2021-11-01 PROCEDURE — 99214 OFFICE O/P EST MOD 30 MIN: CPT | Performed by: ADVANCED PRACTICE MIDWIFE

## 2021-11-01 PROCEDURE — G8419 CALC BMI OUT NRM PARAM NOF/U: HCPCS | Performed by: ADVANCED PRACTICE MIDWIFE

## 2021-11-01 PROCEDURE — G8482 FLU IMMUNIZE ORDER/ADMIN: HCPCS | Performed by: ADVANCED PRACTICE MIDWIFE

## 2021-11-01 NOTE — PROGRESS NOTES
S:  Presents for prenatal visit today, Reports continues to deal with constipation. Baby is active. Note occasional contractions. O:  MVSS, Afebrile. Abdomen gravid, nontender. Cephalic and floating. +FHT's, +FM, OP  A:  25 yo  at 35 Weeks 5 Days +FHT's, Anemia Affecting Pregnancy  P:  Education: discussed FMI, labor S&S, when to call, ds'd exercises to rotate baby to OA, genital GBS status. Thirty minutes spent in education, evualtion and assessment.

## 2021-11-04 LAB
CULTURE: NORMAL
Lab: NORMAL
SPECIMEN DESCRIPTION: NORMAL

## 2021-11-08 ENCOUNTER — ROUTINE PRENATAL (OUTPATIENT)
Dept: OBGYN | Age: 21
End: 2021-11-08
Payer: COMMERCIAL

## 2021-11-08 VITALS
WEIGHT: 156 LBS | SYSTOLIC BLOOD PRESSURE: 112 MMHG | DIASTOLIC BLOOD PRESSURE: 64 MMHG | HEART RATE: 98 BPM | BODY MASS INDEX: 29.48 KG/M2

## 2021-11-08 DIAGNOSIS — Z3A.36 36 WEEKS GESTATION OF PREGNANCY: ICD-10-CM

## 2021-11-08 DIAGNOSIS — Z34.93 PRENATAL CARE, THIRD TRIMESTER: Primary | ICD-10-CM

## 2021-11-08 PROCEDURE — G8419 CALC BMI OUT NRM PARAM NOF/U: HCPCS | Performed by: ADVANCED PRACTICE MIDWIFE

## 2021-11-08 PROCEDURE — G8482 FLU IMMUNIZE ORDER/ADMIN: HCPCS | Performed by: ADVANCED PRACTICE MIDWIFE

## 2021-11-08 PROCEDURE — 1036F TOBACCO NON-USER: CPT | Performed by: ADVANCED PRACTICE MIDWIFE

## 2021-11-08 PROCEDURE — 99214 OFFICE O/P EST MOD 30 MIN: CPT | Performed by: ADVANCED PRACTICE MIDWIFE

## 2021-11-08 PROCEDURE — G8427 DOCREV CUR MEDS BY ELIG CLIN: HCPCS | Performed by: ADVANCED PRACTICE MIDWIFE

## 2021-11-08 PROCEDURE — 99212 OFFICE O/P EST SF 10 MIN: CPT | Performed by: ADVANCED PRACTICE MIDWIFE

## 2021-11-08 NOTE — PROGRESS NOTES
S:  Presents for prenatal visit today. Reports baby is active, occasional contractions. URI, watery eyes, runny nose with clear drainage. O:  MVSS, Afebrile. Abdomen gravid, nontender. S=D, +FHT's, +FM, SVE deferred  A:  23 yo  at 36 Weeks 5 Days SIUP, +FHT's  P:  Education: Discussed and reviewed labor S&S, when to call, continue with exercise to keep baby OA, safe meds for seasonal allergies. RTO 1 week. Thirty minutes spent in education, evaluation and assessment.

## 2021-11-15 ENCOUNTER — ROUTINE PRENATAL (OUTPATIENT)
Dept: OBGYN | Age: 21
End: 2021-11-15
Payer: COMMERCIAL

## 2021-11-15 VITALS
SYSTOLIC BLOOD PRESSURE: 120 MMHG | WEIGHT: 158 LBS | BODY MASS INDEX: 29.85 KG/M2 | HEART RATE: 96 BPM | DIASTOLIC BLOOD PRESSURE: 70 MMHG

## 2021-11-15 DIAGNOSIS — Z3A.37 37 WEEKS GESTATION OF PREGNANCY: ICD-10-CM

## 2021-11-15 DIAGNOSIS — Z34.93 PRENATAL CARE, THIRD TRIMESTER: Primary | ICD-10-CM

## 2021-11-15 PROCEDURE — G8482 FLU IMMUNIZE ORDER/ADMIN: HCPCS | Performed by: ADVANCED PRACTICE MIDWIFE

## 2021-11-15 PROCEDURE — G8419 CALC BMI OUT NRM PARAM NOF/U: HCPCS | Performed by: ADVANCED PRACTICE MIDWIFE

## 2021-11-15 PROCEDURE — 99212 OFFICE O/P EST SF 10 MIN: CPT | Performed by: ADVANCED PRACTICE MIDWIFE

## 2021-11-15 PROCEDURE — 99214 OFFICE O/P EST MOD 30 MIN: CPT | Performed by: ADVANCED PRACTICE MIDWIFE

## 2021-11-15 PROCEDURE — 1036F TOBACCO NON-USER: CPT | Performed by: ADVANCED PRACTICE MIDWIFE

## 2021-11-15 PROCEDURE — G8427 DOCREV CUR MEDS BY ELIG CLIN: HCPCS | Performed by: ADVANCED PRACTICE MIDWIFE

## 2021-11-15 RX ORDER — DOCUSATE SODIUM 100 MG/1
100 CAPSULE, LIQUID FILLED ORAL 2 TIMES DAILY
COMMUNITY
End: 2022-07-25 | Stop reason: ALTCHOICE

## 2021-11-15 NOTE — PROGRESS NOTES
S:  Presents for prenatal visit today. Reports baby is active and no uterine contractions. She reports no worries or concerns. O:  MVSS, Afebrile. Abdomen gravid, nontender. S=D, +FHT's, +FM. SVE FT/50%/Ballotable  A:  25 yo  at 37 Weeks 5 Days SIUP, +FHT's  P:  Education: Labor S&S, when to call. Danger S&S, FMI, preparation for baby. Thirty minutes spent in education, evaluation and assessment. RTO 1 week; Juan Garza

## 2021-11-22 ENCOUNTER — ROUTINE PRENATAL (OUTPATIENT)
Dept: OBGYN | Age: 21
End: 2021-11-22
Payer: COMMERCIAL

## 2021-11-22 VITALS
SYSTOLIC BLOOD PRESSURE: 120 MMHG | HEART RATE: 98 BPM | DIASTOLIC BLOOD PRESSURE: 66 MMHG | BODY MASS INDEX: 29.74 KG/M2 | WEIGHT: 157.4 LBS

## 2021-11-22 DIAGNOSIS — O99.012 ANEMIA DURING PREGNANCY IN SECOND TRIMESTER: ICD-10-CM

## 2021-11-22 DIAGNOSIS — Z3A.38 38 WEEKS GESTATION OF PREGNANCY: ICD-10-CM

## 2021-11-22 DIAGNOSIS — Z34.93 PRENATAL CARE, THIRD TRIMESTER: Primary | ICD-10-CM

## 2021-11-22 PROCEDURE — 99213 OFFICE O/P EST LOW 20 MIN: CPT | Performed by: ADVANCED PRACTICE MIDWIFE

## 2021-11-22 PROCEDURE — G8427 DOCREV CUR MEDS BY ELIG CLIN: HCPCS | Performed by: ADVANCED PRACTICE MIDWIFE

## 2021-11-22 PROCEDURE — 99214 OFFICE O/P EST MOD 30 MIN: CPT | Performed by: ADVANCED PRACTICE MIDWIFE

## 2021-11-22 PROCEDURE — 1036F TOBACCO NON-USER: CPT | Performed by: ADVANCED PRACTICE MIDWIFE

## 2021-11-22 PROCEDURE — G8482 FLU IMMUNIZE ORDER/ADMIN: HCPCS | Performed by: ADVANCED PRACTICE MIDWIFE

## 2021-11-22 PROCEDURE — G8419 CALC BMI OUT NRM PARAM NOF/U: HCPCS | Performed by: ADVANCED PRACTICE MIDWIFE

## 2021-11-22 NOTE — PROGRESS NOTES
S:  Presents for prenatal visit today. Reports increasing low pelvic pressure/pain, no contractions and baby is active. O:  MVSS, Afebrile. Abdomen gravid, nontender. +FHT's, +FM. SVE 1 cm/50%/-3, Cervix soft  A:  23 yo  at 38 Weeks 5 Days SIUP, +FHT's  P:  Education: discussed diet, hydration, labor S&S, when to call. RTO 1 week. Thirty minutes spent in education, evaluation and assessment.

## 2021-11-29 LAB
AMPHETAMINE SCREEN, URINE: NEGATIVE
BANDED NEUTROPHILS RELATIVE PERCENT: 2 % (ref 0–5)
BARBITURATE SCREEN, URINE: NEGATIVE
BASOPHILS %. MANUAL COUNT: 0 % (ref 0–1)
BENZODIAZEPINES, URINE SCREEN: NEGATIVE
CANNABINOID SCREEN URINE: NEGATIVE
COCAINE(METAB.)SCREEN, URINE: NEGATIVE
EOSINOPHILS % MANUAL COUNT: 1 (ref 0–10)
HCT VFR BLD CALC: 34 % (ref 37–47)
HEMOGLOBIN: 12.8 (ref 12–16)
LYMPHOCYTES % MANUAL COUNT: 11 % (ref 21–51)
MCH RBC QN AUTO: 30.4 PG (ref 28.5–32.5)
MCHC RBC AUTO-ENTMCNC: 37.6 G/DL (ref 32–37)
MCV RBC AUTO: 80.9 FL (ref 80–94)
MICROCYTOSIS: NORMAL
MONOCYTES RELATIVE PERCENT: 3 % (ref 2–9)
NEUTROPHILS %. MANUAL COUNT: 83 % (ref 42–75)
OPIATE SCREEN, URINE: NEGATIVE
OXYCODONE SCREEN URINE: NEGATIVE
PDW BLD-RTO: 11.1 % (ref 8.5–15.5)
PHENCYCLIDINE SCREEN URINE: NEGATIVE
PLATELET # BLD: 246.9 THOU/MM3 (ref 130–400)
RBC: 4.2 M/UL (ref 4.2–5.4)
SARS-COV-2, RAPID: NEGATIVE
TRICYCLIC ANTIDEPRESSANTS, UR: NEGATIVE
WBC: 19.9 THOU/ML3 (ref 4.8–10.8)

## 2021-11-30 LAB — N. GONORRHOEAE DNA, URINE: NEGATIVE

## 2021-12-14 ENCOUNTER — POSTPARTUM VISIT (OUTPATIENT)
Dept: OBGYN | Age: 21
End: 2021-12-14
Payer: COMMERCIAL

## 2021-12-14 VITALS
HEIGHT: 60 IN | WEIGHT: 133.4 LBS | HEART RATE: 102 BPM | SYSTOLIC BLOOD PRESSURE: 112 MMHG | DIASTOLIC BLOOD PRESSURE: 60 MMHG | BODY MASS INDEX: 26.19 KG/M2 | OXYGEN SATURATION: 98 %

## 2021-12-14 DIAGNOSIS — Z30.011 FAMILY PLANNING, BCP (BIRTH CONTROL PILLS) INITIAL PRESCRIPTION: ICD-10-CM

## 2021-12-14 PROCEDURE — G8419 CALC BMI OUT NRM PARAM NOF/U: HCPCS | Performed by: ADVANCED PRACTICE MIDWIFE

## 2021-12-14 PROCEDURE — G8482 FLU IMMUNIZE ORDER/ADMIN: HCPCS | Performed by: ADVANCED PRACTICE MIDWIFE

## 2021-12-14 PROCEDURE — 99214 OFFICE O/P EST MOD 30 MIN: CPT | Performed by: ADVANCED PRACTICE MIDWIFE

## 2021-12-14 PROCEDURE — G8427 DOCREV CUR MEDS BY ELIG CLIN: HCPCS | Performed by: ADVANCED PRACTICE MIDWIFE

## 2021-12-14 PROCEDURE — 99212 OFFICE O/P EST SF 10 MIN: CPT | Performed by: ADVANCED PRACTICE MIDWIFE

## 2021-12-14 PROCEDURE — 1036F TOBACCO NON-USER: CPT | Performed by: ADVANCED PRACTICE MIDWIFE

## 2021-12-14 RX ORDER — ACETAMINOPHEN AND CODEINE PHOSPHATE 120; 12 MG/5ML; MG/5ML
1 SOLUTION ORAL DAILY
Qty: 28 TABLET | Refills: 5 | Status: SHIPPED | OUTPATIENT
Start: 2021-12-14 | End: 2022-07-25 | Stop reason: ALTCHOICE

## 2021-12-14 RX ORDER — FAMOTIDINE 20 MG
1 TABLET ORAL DAILY
Qty: 30 TABLET | Refills: 12 | Status: SHIPPED | OUTPATIENT
Start: 2021-12-14 | End: 2022-07-25 | Stop reason: ALTCHOICE

## 2021-12-14 ASSESSMENT — ENCOUNTER SYMPTOMS
RESPIRATORY NEGATIVE: 1
GASTROINTESTINAL NEGATIVE: 1
EYES NEGATIVE: 1

## 2021-12-14 NOTE — PROGRESS NOTES
Subjective:      Patient ID: Obed Linares  is a 24 y.o. y.o. female. Sergio Ann presents for two week postpartum visit. She is exclusively breastfeeding her infant daughter. She reports that she is exclusive nursing every 2-3 hours during the daytime and at night she will stretch out to 3-4 hours. She reports her nipples are a little sore. She has no clogged ducts or breast pain. She denies depression or blues. She is having brownish discharge and she changes every 4 hours for hygeine purposes. She is getting about 6 hours of sleep / 24 hours. Review of Systems   Constitutional: Negative. HENT: Negative. Eyes: Negative. Respiratory: Negative. Cardiovascular: Negative. Gastrointestinal: Negative. Genitourinary: Negative. Musculoskeletal: Negative. Skin: Negative. Neurological: Negative. Psychiatric/Behavioral: Negative. Breast ROS: negative, lactating    Objective:   /60 (Site: Right Upper Arm, Position: Sitting, Cuff Size: Small Adult)   Pulse 102   Ht 5' (1.524 m)   Wt 133 lb 6.4 oz (60.5 kg)   LMP  (LMP Unknown)   SpO2 98%   Breastfeeding Yes   BMI 26.05 kg/m²   Physical Exam  Constitutional:       Appearance: She is well-developed. HENT:      Head: Normocephalic and atraumatic. Eyes:      Conjunctiva/sclera: Conjunctivae normal.   Cardiovascular:      Rate and Rhythm: Normal rate and regular rhythm. Heart sounds: Normal heart sounds. Pulmonary:      Effort: Pulmonary effort is normal.      Breath sounds: Normal breath sounds. Chest:   Breasts: Breasts are symmetrical.      Right: No inverted nipple, mass, nipple discharge, skin change or tenderness. Left: No inverted nipple, mass, nipple discharge, skin change or tenderness. Abdominal:      Palpations: Abdomen is soft. Genitourinary:     Vagina: Normal.      Comments: Normal uterine involution. Lochia small rubra. Perineum intact.   Musculoskeletal:         General: Normal range of motion. Cervical back: Normal range of motion and neck supple. Skin:     General: Skin is warm and dry. Neurological:      Mental Status: She is alert and oriented to person, place, and time. Deep Tendon Reflexes: Reflexes are normal and symmetric. Psychiatric:         Mood and Affect: Mood normal.         Thought Content: Thought content normal.             Assessment:      Diagnosis Orders   1. Care and examination of lactating mother  norethindrone (ORTHO MICRONOR) 0.35 MG tablet    Prenatal Vit-Fe Fumarate-FA (PRENATAL COMPLETE) 14-0.4 MG TABS   2. Family planning, BCP (birth control pills) initial prescription             Plan:   Education: discussed diet, hydration, danger S&S, when to call, family planning options, she desires a POP. Ds'd take pill daily and must be lactating at least 3x/24 hours. BUM for first pack of pills to protect against unintended pregnancy. Start the Sunday following infants 4th week of life. Continue with  PNV daily. RTO 4 weeks. Thirty minutes spent in education, evaluation and assessment.

## 2022-01-12 ENCOUNTER — POSTPARTUM VISIT (OUTPATIENT)
Dept: OBGYN | Age: 22
End: 2022-01-12
Payer: COMMERCIAL

## 2022-01-12 ENCOUNTER — IMMUNIZATION (OUTPATIENT)
Dept: LAB | Age: 22
End: 2022-01-12
Payer: COMMERCIAL

## 2022-01-12 VITALS
HEIGHT: 60 IN | BODY MASS INDEX: 24.9 KG/M2 | DIASTOLIC BLOOD PRESSURE: 60 MMHG | SYSTOLIC BLOOD PRESSURE: 112 MMHG | OXYGEN SATURATION: 97 % | HEART RATE: 87 BPM | WEIGHT: 126.8 LBS

## 2022-01-12 DIAGNOSIS — Z30.011 FAMILY PLANNING, BCP (BIRTH CONTROL PILLS) INITIAL PRESCRIPTION: ICD-10-CM

## 2022-01-12 PROCEDURE — 99212 OFFICE O/P EST SF 10 MIN: CPT

## 2022-01-12 PROCEDURE — 1036F TOBACCO NON-USER: CPT | Performed by: ADVANCED PRACTICE MIDWIFE

## 2022-01-12 PROCEDURE — PBSHW COVID-19, MODERNA PRIMARY SERIES VACCINE 100MCG/0.5ML DOSE: Performed by: INTERNAL MEDICINE

## 2022-01-12 PROCEDURE — 91301 COVID-19, MODERNA PRIMARY SERIES VACCINE 100MCG/0.5ML DOSE: CPT | Performed by: INTERNAL MEDICINE

## 2022-01-12 PROCEDURE — G8420 CALC BMI NORM PARAMETERS: HCPCS | Performed by: ADVANCED PRACTICE MIDWIFE

## 2022-01-12 PROCEDURE — G8482 FLU IMMUNIZE ORDER/ADMIN: HCPCS | Performed by: ADVANCED PRACTICE MIDWIFE

## 2022-01-12 PROCEDURE — G8427 DOCREV CUR MEDS BY ELIG CLIN: HCPCS | Performed by: ADVANCED PRACTICE MIDWIFE

## 2022-01-12 ASSESSMENT — ENCOUNTER SYMPTOMS
RESPIRATORY NEGATIVE: 1
GASTROINTESTINAL NEGATIVE: 1
EYES NEGATIVE: 1

## 2022-01-12 NOTE — PROGRESS NOTES
Subjective:      Patient ID: Oren Doanvon  is a 24 y.o. y.o. female. Ninfa Boyce presents today for 6 week postpartum visit . She is exclusively breastfeeding her infant daughter every 3 hours during the daytime and will stretching out to 6 hours at night. She has an occasional sore nipples. No clogged ducts or mastitis. She is having a small amount of red bleeding, no clots, no pain. She denies fever or chills, no depression, no dysuria, no constipation or diarrhea. She has not resumed intercourse. She is using the POP for family planning. Review of Systems   Constitutional: Negative. HENT: Negative. Eyes: Negative. Respiratory: Negative. Cardiovascular: Negative. Gastrointestinal: Negative. Genitourinary: Negative. Musculoskeletal: Negative. Skin: Negative. Neurological: Negative. Psychiatric/Behavioral: Negative. Breast ROS: negative, lactating    Objective:   /60 (Site: Right Upper Arm, Position: Sitting, Cuff Size: Small Adult)   Pulse 87   Ht 5' (1.524 m)   Wt 126 lb 12.8 oz (57.5 kg)   LMP  (LMP Unknown)   SpO2 97%   Breastfeeding Yes   BMI 24.76 kg/m²   Physical Exam  Constitutional:       Appearance: She is well-developed. HENT:      Head: Normocephalic and atraumatic. Eyes:      Conjunctiva/sclera: Conjunctivae normal.   Cardiovascular:      Rate and Rhythm: Normal rate and regular rhythm. Heart sounds: Normal heart sounds. Pulmonary:      Effort: Pulmonary effort is normal.      Breath sounds: Normal breath sounds. Abdominal:      Palpations: Abdomen is soft. Genitourinary:     Vagina: Normal.      Comments: Normal uterine involution. Lochia scant serosa. Musculoskeletal:         General: Normal range of motion. Cervical back: Normal range of motion and neck supple. Skin:     General: Skin is warm and dry. Neurological:      Mental Status: She is alert and oriented to person, place, and time.       Deep Tendon Reflexes: Reflexes are normal and symmetric. Psychiatric:         Mood and Affect: Mood normal.         Thought Content: Thought content normal.       Depression screen = 0. Assessment:      Diagnosis Orders   1. Postpartum care and examination of lactating mother     2. Family planning, BCP (birth control pills) initial prescription             Plan:   Education:  Discussed diet with calcium intake 2191-1449 mg./day, weight bearing exercise 30-50 minutes 3-5x/day and may resume all previous activities. Thirty minutes spent in education, evaluation and assessment.   RTO 6 months for annual.

## 2022-02-09 ENCOUNTER — IMMUNIZATION (OUTPATIENT)
Dept: LAB | Age: 22
End: 2022-02-09
Payer: COMMERCIAL

## 2022-02-09 PROCEDURE — 91301 COVID-19, MODERNA PRIMARY SERIES VACCINE 100MCG/0.5ML DOSE: CPT | Performed by: INTERNAL MEDICINE

## 2022-02-09 PROCEDURE — PBSHW COVID-19, MODERNA PRIMARY SERIES VACCINE 100MCG/0.5ML DOSE: Performed by: INTERNAL MEDICINE

## 2022-02-25 DIAGNOSIS — Z34.92 PRENATAL CARE, SECOND TRIMESTER: ICD-10-CM

## 2022-02-25 DIAGNOSIS — O99.012 ANEMIA DURING PREGNANCY IN SECOND TRIMESTER: ICD-10-CM

## 2022-02-28 RX ORDER — MULTIVIT WITH MINERALS/LUTEIN
TABLET ORAL
Qty: 30 TABLET | Refills: 2 | Status: SHIPPED | OUTPATIENT
Start: 2022-02-28 | End: 2022-07-25 | Stop reason: ALTCHOICE

## 2022-04-04 ENCOUNTER — OFFICE VISIT (OUTPATIENT)
Dept: OBGYN | Age: 22
End: 2022-04-04
Payer: COMMERCIAL

## 2022-04-04 ENCOUNTER — HOSPITAL ENCOUNTER (OUTPATIENT)
Age: 22
Setting detail: SPECIMEN
Discharge: HOME | End: 2022-04-04
Payer: COMMERCIAL

## 2022-04-04 VITALS
SYSTOLIC BLOOD PRESSURE: 100 MMHG | HEART RATE: 90 BPM | DIASTOLIC BLOOD PRESSURE: 60 MMHG | BODY MASS INDEX: 24.9 KG/M2 | HEIGHT: 60 IN | WEIGHT: 126.8 LBS | OXYGEN SATURATION: 98 %

## 2022-04-04 DIAGNOSIS — N95.2 ATROPHIC VAGINITIS: ICD-10-CM

## 2022-04-04 PROCEDURE — 87660 TRICHOMONAS VAGIN DIR PROBE: CPT

## 2022-04-04 PROCEDURE — 1036F TOBACCO NON-USER: CPT | Performed by: ADVANCED PRACTICE MIDWIFE

## 2022-04-04 PROCEDURE — G8427 DOCREV CUR MEDS BY ELIG CLIN: HCPCS | Performed by: ADVANCED PRACTICE MIDWIFE

## 2022-04-04 PROCEDURE — 87480 CANDIDA DNA DIR PROBE: CPT

## 2022-04-04 PROCEDURE — 87510 GARDNER VAG DNA DIR PROBE: CPT

## 2022-04-04 PROCEDURE — G8420 CALC BMI NORM PARAMETERS: HCPCS | Performed by: ADVANCED PRACTICE MIDWIFE

## 2022-04-04 PROCEDURE — 99213 OFFICE O/P EST LOW 20 MIN: CPT | Performed by: ADVANCED PRACTICE MIDWIFE

## 2022-04-04 PROCEDURE — 99214 OFFICE O/P EST MOD 30 MIN: CPT

## 2022-04-04 RX ORDER — ESTRADIOL 0.1 MG/G
1 CREAM VAGINAL
Qty: 42.5 G | Refills: 3 | Status: SHIPPED | OUTPATIENT
Start: 2022-04-04 | End: 2022-07-25 | Stop reason: ALTCHOICE

## 2022-04-04 ASSESSMENT — ENCOUNTER SYMPTOMS
RESPIRATORY NEGATIVE: 1
GASTROINTESTINAL NEGATIVE: 1
EYES NEGATIVE: 1

## 2022-04-04 NOTE — PROGRESS NOTES
[facility]  Subjective:      Patient ID: Yomaira Restrepo  is a 24 y.o. y.o. female. [facility] presents today with report of dyspareunia. She reports pain with penetration. The pain started following the birth of her daughter. She is exclusively breastfeeding her [de-identified] old infant. Review of Systems   Constitutional: Negative. HENT: Negative. Eyes: Negative. Respiratory: Negative. Cardiovascular: Negative. Gastrointestinal: Negative. Genitourinary: Positive for dyspareunia. Musculoskeletal: Negative. Skin: Negative. Neurological: Negative. Psychiatric/Behavioral: Negative. Breast ROS: negative    Objective:   /60 (Site: Right Upper Arm, Position: Sitting, Cuff Size: Small Adult)   Pulse 90   Ht 5' (1.524 m)   Wt 126 lb 12.8 oz (57.5 kg)   LMP  (LMP Unknown)   SpO2 98%   Breastfeeding Yes   BMI 24.76 kg/m²   Physical Exam  Constitutional:       Appearance: She is well-developed. HENT:      Head: Normocephalic and atraumatic. Eyes:      Conjunctiva/sclera: Conjunctivae normal.   Cardiovascular:      Rate and Rhythm: Normal rate and regular rhythm. Heart sounds: Normal heart sounds. Pulmonary:      Effort: Pulmonary effort is normal.      Breath sounds: Normal breath sounds. Chest:   Breasts: Breasts are symmetrical.      Right: No inverted nipple, mass, nipple discharge, skin change or tenderness. Left: No inverted nipple, mass, nipple discharge, skin change or tenderness. Abdominal:      Palpations: Abdomen is soft. Genitourinary:     General: Normal vulva. Vagina: Normal.      Comments: External genitalia WNL, no lesions noted. Vaginal canal is cherry red and minimal discharge noted, c/w atrophic vaginitis secondary to lactating. Cervix is parous, freely mobile and nontender. Musculoskeletal:         General: Normal range of motion. Cervical back: Normal range of motion and neck supple. Skin:     General: Skin is warm and dry. Neurological:      Mental Status: She is alert and oriented to person, place, and time. Deep Tendon Reflexes: Reflexes are normal and symmetric. Psychiatric:         Mood and Affect: Mood normal.         Thought Content: Thought content normal.           Assessment:      Diagnosis Orders   1. Care and examination of lactating mother  estradiol (ESTRACE VAGINAL) 0.1 MG/GM vaginal cream   2. Atrophic vaginitis  estradiol (ESTRACE VAGINAL) 0.1 MG/GM vaginal cream           Plan:     Orders Placed This Encounter   Procedures    Vaginitis DNA Probe     Standing Status:   Future     Standing Expiration Date:   10/4/2022   Education: discussed lactation and suppression of estrogen, leading to decreased elasticity and vaginal secretions. Two samples of premarin cream given with instruction to use pea size to vaginal canal nightly x 7 days then 2-3 x/week.

## 2022-04-05 LAB
CANDIDA SPECIES, DNA PROBE: NEGATIVE
GARDNERELLA VAGINALIS, DNA PROBE: NEGATIVE
SOURCE: NORMAL
TRICHOMONAS VAGINALIS DNA: NEGATIVE

## 2022-07-25 ENCOUNTER — HOSPITAL ENCOUNTER (OUTPATIENT)
Age: 22
Setting detail: SPECIMEN
Discharge: HOME | End: 2022-07-25
Payer: COMMERCIAL

## 2022-07-25 ENCOUNTER — OFFICE VISIT (OUTPATIENT)
Dept: OBGYN | Age: 22
End: 2022-07-25
Payer: COMMERCIAL

## 2022-07-25 VITALS
WEIGHT: 135.6 LBS | OXYGEN SATURATION: 97 % | HEART RATE: 75 BPM | SYSTOLIC BLOOD PRESSURE: 110 MMHG | BODY MASS INDEX: 26.48 KG/M2 | DIASTOLIC BLOOD PRESSURE: 58 MMHG

## 2022-07-25 DIAGNOSIS — Z12.4 SCREENING FOR MALIGNANT NEOPLASM OF CERVIX: ICD-10-CM

## 2022-07-25 DIAGNOSIS — Z11.3 ROUTINE SCREENING FOR STI (SEXUALLY TRANSMITTED INFECTION): ICD-10-CM

## 2022-07-25 DIAGNOSIS — Z01.419 WOMEN'S ANNUAL ROUTINE GYNECOLOGICAL EXAMINATION: Primary | ICD-10-CM

## 2022-07-25 DIAGNOSIS — Z30.09 FAMILY PLANNING EDUCATION, GUIDANCE, AND COUNSELING: ICD-10-CM

## 2022-07-25 PROCEDURE — 99395 PREV VISIT EST AGE 18-39: CPT | Performed by: ADVANCED PRACTICE MIDWIFE

## 2022-07-25 PROCEDURE — 87591 N.GONORRHOEAE DNA AMP PROB: CPT

## 2022-07-25 PROCEDURE — G0145 SCR C/V CYTO,THINLAYER,RESCR: HCPCS

## 2022-07-25 PROCEDURE — 87491 CHLMYD TRACH DNA AMP PROBE: CPT

## 2022-07-25 RX ORDER — LEVONORGESTREL AND ETHINYL ESTRADIOL 0.1-0.02MG
1 KIT ORAL DAILY
Qty: 84 TABLET | Refills: 5 | Status: SHIPPED | OUTPATIENT
Start: 2022-07-25

## 2022-07-25 ASSESSMENT — ENCOUNTER SYMPTOMS
GASTROINTESTINAL NEGATIVE: 1
EYES NEGATIVE: 1
RESPIRATORY NEGATIVE: 1

## 2022-07-25 NOTE — PROGRESS NOTES
Chaperone for Intimate Exam  Chaperone was offered as part of the rooming process. Patient declined and agrees to continue with exam without a chaperone.   Chaperone: Marcello Calhoun LPN

## 2022-07-25 NOTE — PROGRESS NOTES
Subjective:      Patient ID: Scott Obrien  is a 24 y.o. y.o. female. Tia Forte presents today for annual examination. She reports monthly menses and bleeding for four days requiring a regular tampon change every 3 hours for two days. She reports low back pain with her menses she would rate 3-4/10. She uses ibuprofen for the pain. Review of Systems   Constitutional: Negative. HENT: Negative. Eyes: Negative. Respiratory: Negative. Cardiovascular: Negative. Gastrointestinal: Negative. Genitourinary:  Positive for menstrual problem. Musculoskeletal: Negative. Skin: Negative. Neurological: Negative. Psychiatric/Behavioral: Negative. Breast ROS: negative    Objective:   BP (!) 110/58 (Site: Right Upper Arm, Position: Sitting)   Pulse 75   Wt 135 lb 9.6 oz (61.5 kg)   LMP 07/18/2022 (Exact Date)   SpO2 97%   Breastfeeding No   BMI 26.48 kg/m²   Physical Exam  Constitutional:       Appearance: She is well-developed. HENT:      Head: Normocephalic and atraumatic. Eyes:      Conjunctiva/sclera: Conjunctivae normal.   Cardiovascular:      Rate and Rhythm: Normal rate and regular rhythm. Heart sounds: Normal heart sounds. Pulmonary:      Effort: Pulmonary effort is normal.      Breath sounds: Normal breath sounds. Chest:   Breasts:     Breasts are symmetrical.      Right: No inverted nipple, mass, nipple discharge, skin change or tenderness. Left: No inverted nipple, mass, nipple discharge, skin change or tenderness. Abdominal:      Palpations: Abdomen is soft. Genitourinary:     General: Normal vulva. Vagina: Normal.      Comments: External genitalia WNL, no lesions noted. Vaginal canal is pink with rugae present and normal appearing, nonodorous discharge. Cervix is parous, freely mobile and nontender. Uterus is NSSAVNT, adnexa are small, freely mobile and nontender. No abnormalities noted. Musculoskeletal:         General: Normal range of motion. Cervical back: Normal range of motion and neck supple. Skin:     General: Skin is warm and dry. Neurological:      Mental Status: She is alert and oriented to person, place, and time. Deep Tendon Reflexes: Reflexes are normal and symmetric. Psychiatric:         Mood and Affect: Mood normal.         Thought Content: Thought content normal.       Sexually active: Yes  Any bleeding or pain withintercourse: No  Last Yearly:  2021  Last Pap No previous   Do you do self breast exams: No  Family H/P Maternal Great Aunt Breast Cancer [id number]  Assessment:      Diagnosis Orders   1. Women's annual routine gynecological examination        2. Family planning education, guidance, and counseling  levonorgestrel-ethinyl estradiol (AVIANE;ALESSE;LESSINA) 0.1-20 MG-MCG per tablet      3. Screening for malignant neoplasm of cervix  PAP SMEAR      4. Routine screening for STI (sexually transmitted infection)  Chlamydia/GC DNA, Thin Prep              Plan:      Orders Placed This Encounter   Procedures    Chlamydia/GC DNA, Thin Prep     Standing Status:   Future     Standing Expiration Date:   7/25/2023    PAP SMEAR     Standing Status:   Future     Standing Expiration Date:   9/25/2022     Order Specific Question:   Collection Type     Answer: Thin Prep     Order Specific Question:   Prior Abnormal Pap Test     Answer:   No     Order Specific Question:   Screening or Diagnostic     Answer:   Screening     Order Specific Question:   HPV Requested? Answer:   N/A   Edycation: discussed and reviewed options to control menstrual pain. Will start with low dose OCP's, reviewed how to take the pills, missed and late pills and ACHES. Recommend condom use to protect against STI. RTO 12 months and prn.

## 2022-07-27 LAB
CHLAMYDIA BY THIN PREP: NEGATIVE
N. GONORRHOEAE DNA, THIN PREP: NEGATIVE
SPECIMEN DESCRIPTION: NORMAL

## 2022-08-02 LAB — CYTOLOGY REPORT: NORMAL

## 2023-08-02 ENCOUNTER — OFFICE VISIT (OUTPATIENT)
Dept: OBGYN | Age: 23
End: 2023-08-02
Payer: COMMERCIAL

## 2023-08-02 ENCOUNTER — HOSPITAL ENCOUNTER (OUTPATIENT)
Age: 23
Setting detail: SPECIMEN
Discharge: HOME | End: 2023-08-02
Payer: COMMERCIAL

## 2023-08-02 VITALS
DIASTOLIC BLOOD PRESSURE: 84 MMHG | SYSTOLIC BLOOD PRESSURE: 102 MMHG | BODY MASS INDEX: 27.46 KG/M2 | HEART RATE: 81 BPM | WEIGHT: 140.6 LBS

## 2023-08-02 DIAGNOSIS — Z11.3 ROUTINE SCREENING FOR STI (SEXUALLY TRANSMITTED INFECTION): ICD-10-CM

## 2023-08-02 DIAGNOSIS — Z01.419 WOMEN'S ANNUAL ROUTINE GYNECOLOGICAL EXAMINATION: Primary | ICD-10-CM

## 2023-08-02 DIAGNOSIS — Z30.09 FAMILY PLANNING EDUCATION, GUIDANCE, AND COUNSELING: ICD-10-CM

## 2023-08-02 PROCEDURE — 87591 N.GONORRHOEAE DNA AMP PROB: CPT

## 2023-08-02 PROCEDURE — 87491 CHLMYD TRACH DNA AMP PROBE: CPT

## 2023-08-02 PROCEDURE — 99395 PREV VISIT EST AGE 18-39: CPT | Performed by: ADVANCED PRACTICE MIDWIFE

## 2023-08-02 SDOH — ECONOMIC STABILITY: FOOD INSECURITY: WITHIN THE PAST 12 MONTHS, YOU WORRIED THAT YOUR FOOD WOULD RUN OUT BEFORE YOU GOT MONEY TO BUY MORE.: NEVER TRUE

## 2023-08-02 SDOH — ECONOMIC STABILITY: HOUSING INSECURITY
IN THE LAST 12 MONTHS, WAS THERE A TIME WHEN YOU DID NOT HAVE A STEADY PLACE TO SLEEP OR SLEPT IN A SHELTER (INCLUDING NOW)?: NO

## 2023-08-02 SDOH — ECONOMIC STABILITY: FOOD INSECURITY: WITHIN THE PAST 12 MONTHS, THE FOOD YOU BOUGHT JUST DIDN'T LAST AND YOU DIDN'T HAVE MONEY TO GET MORE.: NEVER TRUE

## 2023-08-02 SDOH — ECONOMIC STABILITY: INCOME INSECURITY: HOW HARD IS IT FOR YOU TO PAY FOR THE VERY BASICS LIKE FOOD, HOUSING, MEDICAL CARE, AND HEATING?: NOT VERY HARD

## 2023-08-02 ASSESSMENT — PATIENT HEALTH QUESTIONNAIRE - PHQ9
2. FEELING DOWN, DEPRESSED OR HOPELESS: 0
1. LITTLE INTEREST OR PLEASURE IN DOING THINGS: 0
SUM OF ALL RESPONSES TO PHQ9 QUESTIONS 1 & 2: 0
SUM OF ALL RESPONSES TO PHQ QUESTIONS 1-9: 0

## 2023-08-02 ASSESSMENT — ENCOUNTER SYMPTOMS
GASTROINTESTINAL NEGATIVE: 1
RESPIRATORY NEGATIVE: 1
EYES NEGATIVE: 1

## 2023-08-03 LAB
C TRACH DNA SPEC QL PROBE+SIG AMP: NEGATIVE
N GONORRHOEA DNA SPEC QL PROBE+SIG AMP: NEGATIVE
SPECIMEN DESCRIPTION: NORMAL

## 2023-08-21 ENCOUNTER — OFFICE VISIT (OUTPATIENT)
Dept: PRIMARY CARE CLINIC | Age: 23
End: 2023-08-21
Payer: COMMERCIAL

## 2023-08-21 VITALS
OXYGEN SATURATION: 98 % | HEART RATE: 77 BPM | TEMPERATURE: 97.1 F | HEIGHT: 60 IN | RESPIRATION RATE: 16 BRPM | DIASTOLIC BLOOD PRESSURE: 80 MMHG | BODY MASS INDEX: 27.8 KG/M2 | SYSTOLIC BLOOD PRESSURE: 110 MMHG | WEIGHT: 141.6 LBS

## 2023-08-21 DIAGNOSIS — J06.9 VIRAL URI: Primary | ICD-10-CM

## 2023-08-21 LAB
INFLUENZA A ANTIGEN, POC: NEGATIVE
INFLUENZA B ANTIGEN, POC: NEGATIVE
LOT EXPIRE DATE: NORMAL
LOT KIT NUMBER: NORMAL
SARS-COV-2, POC: NORMAL
VALID INTERNAL CONTROL: NORMAL
VENDOR AND KIT NAME POC: NORMAL

## 2023-08-21 PROCEDURE — G8419 CALC BMI OUT NRM PARAM NOF/U: HCPCS | Performed by: NURSE PRACTITIONER

## 2023-08-21 PROCEDURE — 99212 OFFICE O/P EST SF 10 MIN: CPT | Performed by: NURSE PRACTITIONER

## 2023-08-21 PROCEDURE — 87428 SARSCOV & INF VIR A&B AG IA: CPT | Performed by: NURSE PRACTITIONER

## 2023-08-21 PROCEDURE — 1036F TOBACCO NON-USER: CPT | Performed by: NURSE PRACTITIONER

## 2023-08-21 PROCEDURE — PBSHW POCT COVID-19 & INFLUENZA A/B: Performed by: NURSE PRACTITIONER

## 2023-08-21 PROCEDURE — G8427 DOCREV CUR MEDS BY ELIG CLIN: HCPCS | Performed by: NURSE PRACTITIONER

## 2023-08-21 PROCEDURE — 99213 OFFICE O/P EST LOW 20 MIN: CPT | Performed by: NURSE PRACTITIONER

## 2023-08-21 RX ORDER — PREDNISONE 10 MG/1
10 TABLET ORAL 2 TIMES DAILY
Qty: 10 TABLET | Refills: 0 | Status: SHIPPED | OUTPATIENT
Start: 2023-08-21 | End: 2023-08-26

## 2023-08-21 ASSESSMENT — ENCOUNTER SYMPTOMS
COUGH: 1
NAUSEA: 0
SORE THROAT: 1
VOMITING: 0
ABDOMINAL PAIN: 0

## 2023-09-26 ENCOUNTER — OFFICE VISIT (OUTPATIENT)
Dept: PRIMARY CARE CLINIC | Age: 23
End: 2023-09-26
Payer: COMMERCIAL

## 2023-09-26 VITALS
RESPIRATION RATE: 26 BRPM | TEMPERATURE: 100.9 F | HEART RATE: 102 BPM | SYSTOLIC BLOOD PRESSURE: 98 MMHG | DIASTOLIC BLOOD PRESSURE: 64 MMHG | OXYGEN SATURATION: 99 %

## 2023-09-26 DIAGNOSIS — R50.9 FEVER, UNSPECIFIED FEVER CAUSE: ICD-10-CM

## 2023-09-26 DIAGNOSIS — J02.0 STREP THROAT: Primary | ICD-10-CM

## 2023-09-26 LAB
INFLUENZA A ANTIGEN, POC: NEGATIVE
INFLUENZA B ANTIGEN, POC: NEGATIVE
LOT EXPIRE DATE: NORMAL
LOT KIT NUMBER: NORMAL
S PYO AG THROAT QL: POSITIVE
SARS-COV-2, POC: NORMAL
VALID INTERNAL CONTROL: NORMAL
VENDOR AND KIT NAME POC: NORMAL

## 2023-09-26 PROCEDURE — PBSHW POCT RAPID STREP A: Performed by: NURSE PRACTITIONER

## 2023-09-26 PROCEDURE — 87428 SARSCOV & INF VIR A&B AG IA: CPT | Performed by: NURSE PRACTITIONER

## 2023-09-26 PROCEDURE — PBSHW POCT COVID-19 & INFLUENZA A/B: Performed by: NURSE PRACTITIONER

## 2023-09-26 PROCEDURE — G8419 CALC BMI OUT NRM PARAM NOF/U: HCPCS | Performed by: NURSE PRACTITIONER

## 2023-09-26 PROCEDURE — G8427 DOCREV CUR MEDS BY ELIG CLIN: HCPCS | Performed by: NURSE PRACTITIONER

## 2023-09-26 PROCEDURE — 1036F TOBACCO NON-USER: CPT | Performed by: NURSE PRACTITIONER

## 2023-09-26 PROCEDURE — 99213 OFFICE O/P EST LOW 20 MIN: CPT | Performed by: NURSE PRACTITIONER

## 2023-09-26 PROCEDURE — 87880 STREP A ASSAY W/OPTIC: CPT | Performed by: NURSE PRACTITIONER

## 2023-09-26 RX ORDER — CEPHALEXIN 500 MG/1
500 CAPSULE ORAL 2 TIMES DAILY
Qty: 20 CAPSULE | Refills: 0 | Status: SHIPPED | OUTPATIENT
Start: 2023-09-26 | End: 2023-10-06

## 2023-09-26 ASSESSMENT — ENCOUNTER SYMPTOMS
NAUSEA: 0
COUGH: 0
VOMITING: 0
RHINORRHEA: 0
RESPIRATORY NEGATIVE: 1
ABDOMINAL PAIN: 0
SORE THROAT: 1

## 2023-09-26 ASSESSMENT — PATIENT HEALTH QUESTIONNAIRE - PHQ9
SUM OF ALL RESPONSES TO PHQ QUESTIONS 1-9: 0
1. LITTLE INTEREST OR PLEASURE IN DOING THINGS: 0
SUM OF ALL RESPONSES TO PHQ9 QUESTIONS 1 & 2: 0
SUM OF ALL RESPONSES TO PHQ QUESTIONS 1-9: 0
2. FEELING DOWN, DEPRESSED OR HOPELESS: 0
SUM OF ALL RESPONSES TO PHQ QUESTIONS 1-9: 0
SUM OF ALL RESPONSES TO PHQ QUESTIONS 1-9: 0

## 2023-09-26 NOTE — PROGRESS NOTES
[facility] [place] [address] [facility] [address] [place] [address]                        Telephone [phone]             Fax [phone]     Jacquie Porras  2000  XOB:[id number]   Date of visit:  9/26/2023    Subjective:    Jacquie Porras is a 21 y.o.  female who presents to [facility] today (9/26/2023) for evaluation of:    Chief Complaint   Patient presents with    Sore Throat     Fever, cough, positive exposure to covid last week. Sx started yesterday worse today       Pharyngitis  This is a new problem. The current episode started yesterday. The problem occurs constantly. The problem has been gradually worsening. Associated symptoms include chills, a fever (100.8 this morning), headaches and a sore throat. Pertinent negatives include no abdominal pain, chest pain, congestion, coughing, nausea, rash or vomiting. Associated symptoms comments: Body aches. The symptoms are aggravated by swallowing. She has tried acetaminophen for the symptoms. The treatment provided mild relief. Exposure to Covid-19. She has the following problem list:  Patient Active Problem List   Diagnosis    COVID-19 affecting pregnancy in third trimester        Current medications are:  Current Outpatient Medications   Medication Sig Dispense Refill    cephALEXin (KEFLEX) 500 MG capsule Take 1 capsule by mouth 2 times daily for 10 days 20 capsule 0    levonorgestrel-ethinyl estradiol (AVIANE;ALESSE;LESSINA) 0.1-20 MG-MCG per tablet Take 1 tablet by mouth in the morning. 84 tablet 5    ferrous sulfate (IRON 325) 325 (65 Fe) MG tablet        No current facility-administered medications for this visit. She is allergic to eql liquid hand soap [basis cleanser], other, and pcn [penicillins]. .    She  reports that she has never smoked.  She has never used smokeless tobacco.      Objective:    Vitals:    09/26/23 0817   BP: 98/64   Site: Left

## 2024-06-21 ENCOUNTER — OFFICE VISIT (OUTPATIENT)
Dept: FAMILY MEDICINE CLINIC | Age: 24
End: 2024-06-21

## 2024-06-21 VITALS
RESPIRATION RATE: 16 BRPM | WEIGHT: 138.4 LBS | OXYGEN SATURATION: 97 % | BODY MASS INDEX: 25.47 KG/M2 | DIASTOLIC BLOOD PRESSURE: 50 MMHG | SYSTOLIC BLOOD PRESSURE: 96 MMHG | HEART RATE: 129 BPM | HEIGHT: 62 IN | TEMPERATURE: 102.5 F

## 2024-06-21 DIAGNOSIS — J02.0 ACUTE STREPTOCOCCAL PHARYNGITIS: Primary | ICD-10-CM

## 2024-06-21 DIAGNOSIS — J02.9 SORE THROAT: ICD-10-CM

## 2024-06-21 LAB — S PYO AG THROAT QL: POSITIVE

## 2024-06-21 PROCEDURE — 87880 STREP A ASSAY W/OPTIC: CPT | Performed by: NURSE PRACTITIONER

## 2024-06-21 PROCEDURE — 99212 OFFICE O/P EST SF 10 MIN: CPT | Performed by: NURSE PRACTITIONER

## 2024-06-21 RX ORDER — CEPHALEXIN 500 MG/1
500 CAPSULE ORAL 2 TIMES DAILY
Qty: 20 CAPSULE | Refills: 0 | Status: SHIPPED | OUTPATIENT
Start: 2024-06-21 | End: 2024-07-01

## 2024-06-21 RX ORDER — IBUPROFEN 600 MG/1
600 TABLET ORAL 3 TIMES DAILY PRN
Qty: 90 TABLET | Refills: 0 | Status: SHIPPED | OUTPATIENT
Start: 2024-06-21

## 2024-06-21 ASSESSMENT — ENCOUNTER SYMPTOMS
SHORTNESS OF BREATH: 0
COUGH: 0
ABDOMINAL PAIN: 0
DIARRHEA: 0
SORE THROAT: 1
NAUSEA: 0

## 2024-06-21 ASSESSMENT — PATIENT HEALTH QUESTIONNAIRE - PHQ9
1. LITTLE INTEREST OR PLEASURE IN DOING THINGS: NOT AT ALL
2. FEELING DOWN, DEPRESSED OR HOPELESS: NOT AT ALL
SUM OF ALL RESPONSES TO PHQ QUESTIONS 1-9: 0
SUM OF ALL RESPONSES TO PHQ9 QUESTIONS 1 & 2: 0

## 2024-06-21 NOTE — PROGRESS NOTES
Renetta Stafford, APRN-CNP  [place] [id number]  [phone]        6/21/2024     Keenan Noyola is a 23 y.o. female, here for evaluation of the following medical concerns:    Chief Complaint   Patient presents with    Pharyngitis     Fever sx since last night.        Pharyngitis  This is a new problem. The current episode started yesterday. The problem has been gradually worsening. Associated symptoms include arthralgias, chills, fatigue, a fever, headaches, myalgias and a sore throat. Pertinent negatives include no abdominal pain, coughing or nausea.       Patient Active Problem List   Diagnosis    COVID-19 affecting pregnancy in third trimester       Patient's recent lab reports are as follows:      Results for orders placed or performed in visit on 06/21/24   POCT rapid strep A   Result Value Ref Range    Strep A Ag Positive (A) None Detected     Other review of systems are as noted below.    Preventative measures are reviewed today. See health maintenance section for complete preventative plan of care.    Did review patient's med list, allergies, social history, fam history, pmhx and pshx today as noted in the record.      Review of Systems   Constitutional:  Positive for chills, fatigue and fever.   HENT:  Positive for sore throat.    Respiratory:  Negative for cough and shortness of breath.    Gastrointestinal:  Negative for abdominal pain, diarrhea and nausea.   Musculoskeletal:  Positive for arthralgias and myalgias.   Neurological:  Positive for headaches.         Prior to Visit Medications    Medication Sig Taking? Authorizing Provider   cephALEXin (KEFLEX) 500 MG capsule Take 1 capsule by mouth 2 times daily for 10 days Yes Renetta Stafford, APRN - NP   ibuprofen (ADVIL;MOTRIN) 600 MG tablet Take 1 tablet by mouth 3 times daily as needed for Pain Yes Renetta Stafford, ALESSANDRA - NP        Social History     Tobacco Use    Smoking status: Never    Smokeless

## 2024-10-17 ENCOUNTER — HOSPITAL ENCOUNTER (OUTPATIENT)
Age: 24
Discharge: HOME | End: 2024-10-17

## 2024-10-17 ENCOUNTER — OFFICE VISIT (OUTPATIENT)
Dept: OBGYN | Age: 24
End: 2024-10-17

## 2024-10-17 VITALS
WEIGHT: 144.4 LBS | SYSTOLIC BLOOD PRESSURE: 118 MMHG | DIASTOLIC BLOOD PRESSURE: 60 MMHG | BODY MASS INDEX: 27.26 KG/M2 | HEART RATE: 88 BPM | OXYGEN SATURATION: 98 % | HEIGHT: 61 IN

## 2024-10-17 DIAGNOSIS — Z34.90 EARLY STAGE OF PREGNANCY: ICD-10-CM

## 2024-10-17 DIAGNOSIS — N92.6 MISSED MENSES: Primary | ICD-10-CM

## 2024-10-17 DIAGNOSIS — N92.6 MISSED MENSES: ICD-10-CM

## 2024-10-17 LAB — B-HCG SERPL EIA 3RD IS-ACNC: ABNORMAL MIU/ML

## 2024-10-17 PROCEDURE — 84702 CHORIONIC GONADOTROPIN TEST: CPT

## 2024-10-17 PROCEDURE — 99213 OFFICE O/P EST LOW 20 MIN: CPT | Performed by: ADVANCED PRACTICE MIDWIFE

## 2024-10-17 PROCEDURE — 36415 COLL VENOUS BLD VENIPUNCTURE: CPT

## 2024-10-17 ASSESSMENT — ENCOUNTER SYMPTOMS
RESPIRATORY NEGATIVE: 1
EYES NEGATIVE: 1
GASTROINTESTINAL NEGATIVE: 1

## 2024-10-17 NOTE — PROGRESS NOTES
Subjective:      Patient ID: Keenan Noyola  is a 24 y.o. y.o. female.    Keenan presents today with missed menses. She reports sure LMP of 8/24/2024. Her cycles are monthly and she bleeds for about four days. She took a home pregnancy test with missed menses that was positive. She reports morning sickness and breast tenderness. She reports this is a planned pregnancy and excited about it.         Review of Systems   Constitutional: Negative.    HENT: Negative.     Eyes: Negative.    Respiratory: Negative.     Cardiovascular: Negative.    Gastrointestinal: Negative.    Genitourinary:  Positive for menstrual problem.   Musculoskeletal: Negative.    Skin: Negative.    Neurological: Negative.    Psychiatric/Behavioral: Negative.     Breast ROS: negative    Objective:   /60 (Site: Right Upper Arm, Position: Sitting, Cuff Size: Medium Adult)   Pulse 88   Ht 1.558 m (5' 1.34\")   Wt 65.5 kg (144 lb 6.4 oz)   LMP 08/24/2024 (Exact Date)   SpO2 98%   Breastfeeding No   BMI 26.98 kg/m²   Physical Exam  Constitutional:       Appearance: She is well-developed.   HENT:      Head: Normocephalic and atraumatic.   Eyes:      Conjunctiva/sclera: Conjunctivae normal.      Pupils: Pupils are equal, round, and reactive to light.   Cardiovascular:      Rate and Rhythm: Normal rate and regular rhythm.      Heart sounds: Normal heart sounds.   Pulmonary:      Effort: Pulmonary effort is normal.      Breath sounds: Normal breath sounds.   Abdominal:      General: Bowel sounds are normal.      Palpations: Abdomen is soft.   Genitourinary:     Vagina: Normal.   Musculoskeletal:         General: Normal range of motion.      Cervical back: Normal range of motion and neck supple.   Skin:     General: Skin is warm and dry.   Neurological:      Mental Status: She is alert and oriented to person, place, and time.      Deep Tendon Reflexes: Reflexes are normal and symmetric.       Component  Ref Range & Units 10/17/24 1537   hCG

## 2024-10-28 ENCOUNTER — INITIAL PRENATAL (OUTPATIENT)
Dept: OBGYN | Age: 24
End: 2024-10-28

## 2024-10-28 ENCOUNTER — HOSPITAL ENCOUNTER (OUTPATIENT)
Age: 24
Discharge: HOME | End: 2024-10-28

## 2024-10-28 ENCOUNTER — HOSPITAL ENCOUNTER (OUTPATIENT)
Dept: INTERVENTIONAL RADIOLOGY/VASCULAR | Age: 24
Discharge: HOME | End: 2024-10-30

## 2024-10-28 VITALS
BODY MASS INDEX: 26.91 KG/M2 | DIASTOLIC BLOOD PRESSURE: 62 MMHG | SYSTOLIC BLOOD PRESSURE: 100 MMHG | HEART RATE: 80 BPM | WEIGHT: 144 LBS

## 2024-10-28 DIAGNOSIS — Z34.91 PRENATAL CARE, FIRST TRIMESTER: ICD-10-CM

## 2024-10-28 DIAGNOSIS — Z34.92 PRENATAL CARE, SECOND TRIMESTER: ICD-10-CM

## 2024-10-28 DIAGNOSIS — Z3A.09 9 WEEKS GESTATION OF PREGNANCY: ICD-10-CM

## 2024-10-28 DIAGNOSIS — Z34.91 PRENATAL CARE, FIRST TRIMESTER: Primary | ICD-10-CM

## 2024-10-28 DIAGNOSIS — Z34.90 EARLY STAGE OF PREGNANCY: ICD-10-CM

## 2024-10-28 LAB
25(OH)D3 SERPL-MCNC: 31.5 NG/ML (ref 30–100)
ABO + RH BLD: NORMAL
BASOPHILS # BLD: 0.04 K/UL (ref 0–0.2)
BASOPHILS NFR BLD: 0 % (ref 0–2)
BLOOD GROUP ANTIBODIES SERPL: NEGATIVE
EOSINOPHIL # BLD: 0.16 K/UL (ref 0–0.44)
EOSINOPHILS RELATIVE PERCENT: 1 % (ref 1–4)
ERYTHROCYTE [DISTWIDTH] IN BLOOD BY AUTOMATED COUNT: 12.5 % (ref 11.8–14.4)
HBV SURFACE AG SERPL QL IA: NONREACTIVE
HCT VFR BLD AUTO: 37.1 % (ref 36.3–47.1)
HCV AB SERPL QL IA: NONREACTIVE
HGB BLD-MCNC: 12.8 G/DL (ref 11.9–15.1)
HIV 1+2 AB+HIV1 P24 AG SERPL QL IA: NONREACTIVE
IMM GRANULOCYTES # BLD AUTO: 0.04 K/UL (ref 0–0.3)
IMM GRANULOCYTES NFR BLD: 0 %
LYMPHOCYTES NFR BLD: 2.33 K/UL (ref 1.1–3.7)
LYMPHOCYTES RELATIVE PERCENT: 21 % (ref 24–43)
MCH RBC QN AUTO: 29.2 PG (ref 25.2–33.5)
MCHC RBC AUTO-ENTMCNC: 34.5 G/DL (ref 25.2–33.5)
MCV RBC AUTO: 84.5 FL (ref 82.6–102.9)
MONOCYTES NFR BLD: 0.54 K/UL (ref 0.1–1.2)
MONOCYTES NFR BLD: 5 % (ref 3–12)
NEUTROPHILS NFR BLD: 73 % (ref 36–65)
NEUTS SEG NFR BLD: 8.24 K/UL (ref 1.5–8.1)
NRBC BLD-RTO: 0 PER 100 WBC
PLATELET # BLD AUTO: 341 K/UL (ref 138–453)
PMV BLD AUTO: 11.6 FL (ref 8.1–13.5)
RBC # BLD AUTO: 4.39 M/UL (ref 3.95–5.11)
RUBV IGG SERPL QL IA: 339 IU/ML
T PALLIDUM AB SER QL IA: NONREACTIVE
WBC OTHER # BLD: 11.4 K/UL (ref 3.5–11.3)

## 2024-10-28 PROCEDURE — 99214 OFFICE O/P EST MOD 30 MIN: CPT | Performed by: ADVANCED PRACTICE MIDWIFE

## 2024-10-28 PROCEDURE — 86762 RUBELLA ANTIBODY: CPT

## 2024-10-28 PROCEDURE — 86787 VARICELLA-ZOSTER ANTIBODY: CPT

## 2024-10-28 PROCEDURE — 86900 BLOOD TYPING SEROLOGIC ABO: CPT

## 2024-10-28 PROCEDURE — 86850 RBC ANTIBODY SCREEN: CPT

## 2024-10-28 PROCEDURE — 36415 COLL VENOUS BLD VENIPUNCTURE: CPT

## 2024-10-28 PROCEDURE — 99214 OFFICE O/P EST MOD 30 MIN: CPT

## 2024-10-28 PROCEDURE — 76801 OB US < 14 WKS SINGLE FETUS: CPT

## 2024-10-28 PROCEDURE — 87340 HEPATITIS B SURFACE AG IA: CPT

## 2024-10-28 PROCEDURE — 86901 BLOOD TYPING SEROLOGIC RH(D): CPT

## 2024-10-28 PROCEDURE — 87389 HIV-1 AG W/HIV-1&-2 AB AG IA: CPT

## 2024-10-28 PROCEDURE — 86780 TREPONEMA PALLIDUM: CPT

## 2024-10-28 PROCEDURE — 82306 VITAMIN D 25 HYDROXY: CPT

## 2024-10-28 PROCEDURE — 85025 COMPLETE CBC W/AUTO DIFF WBC: CPT

## 2024-10-28 PROCEDURE — 86803 HEPATITIS C AB TEST: CPT

## 2024-10-28 RX ORDER — LANOLIN ALCOHOL/MO/W.PET/CERES
50 CREAM (GRAM) TOPICAL DAILY
COMMUNITY

## 2024-10-28 NOTE — PROGRESS NOTES
Initial Labs reviewed with patient and purpose, pended. Urine done in lab today.      Initial prenatal folder given, reviewed with patient registration form for [facility], birthing and breastfeeding classes, and map of [facility] campus, reviewed safe medications to take during pregnancy, and phone numbers for office and after hours how to reach nurse midwife on call. Encouraged patient to read rest of information in folder related to birthing, breastfeeding, marijuana use, ect.  Discussed plan of care throughout pregnancy related to when OB ultrasounds would be, blood work and testing for duration of pregnancy, and spoke about panorama test. Instructed patient if she did not want to know gender of baby not to open mychart results.     Discussed with patient that prenatal appointments will be here in [place] office and all babies are delivered at [facility] unless otherwise sent to [facility] for MFM or high risk.     Planned pregnancy: Yes  Mom's Employment: [employer]  Living arrangements: together  Marital status: dating  FOB: [place]  Breast or formula feeding: breast  Discussed flu/Tdap/RSV/RhoGam: yes  Hx diabetes or >10lb baby: no  Hx thyroid issues: no  Hx : no  Hx Colpo or LEEP: no     Headaches: no  Dizziness: no  Nausea: yes  Vomiting: yes  Bleeding: no  Contractions: no  Loss of Fluids: no

## 2024-10-28 NOTE — PROGRESS NOTES
S: Presents for prenatal visit today Reports planned pregnancy, feeling some morning sickness. No bleeding, no concerns.   O:  MVSS, Afebrile. Abdomen gravid, US = 9 Weeks 1 Day = EDC 2025,   A:  23 yo  at 9 Weeks 2 Days SIUP, +FHT's, Prenatal Care First Trimester  P:  Education: discussed and reviewed diet and hydration, calcium intake 3995-5484 mg./day, hydration 64 oz. Water daily, safe meds, danger S&S, when to call. RTO 4 weeks. Thirty minutes spent in education, evaluation and assessment.

## 2024-10-30 LAB — VZV IGG SER QL IA: 1.75

## 2024-11-02 LAB
Lab: NORMAL
NTRA 22Q11.2 DELETION SYNDROME POPULATION-BASED RISK TEXT: NORMAL
NTRA 22Q11.2 DELETION SYNDROME RESULT TEXT: NORMAL
NTRA 22Q11.2 DELETION SYNDROME RISK SCORE TEXT: NORMAL
NTRA FETAL FRACTION: NORMAL
NTRA FETAL RHD SUMMARY: NORMAL
NTRA GENDER OF FETUS: NORMAL
NTRA MONOSOMY X AGE-BASED RISK TEXT: NORMAL
NTRA MONOSOMY X RESULT TEXT: NORMAL
NTRA MONOSOMY X RISK SCORE TEXT: NORMAL
NTRA TRIPLOIDY RESULT TEXT: NORMAL
NTRA TRISOMY 13 AGE-BASED RISK TEXT: NORMAL
NTRA TRISOMY 13 RESULT TEXT: NORMAL
NTRA TRISOMY 13 RISK SCORE TEXT: NORMAL
NTRA TRISOMY 18 AGE-BASED RISK TEXT: NORMAL
NTRA TRISOMY 18 RESULT TEXT: NORMAL
NTRA TRISOMY 18 RISK SCORE TEXT: NORMAL
NTRA TRISOMY 21 AGE-BASED RISK TEXT: NORMAL
NTRA TRISOMY 21 RESULT TEXT: NORMAL
NTRA TRISOMY 21 RISK SCORE TEXT: NORMAL

## 2024-11-27 ENCOUNTER — HOSPITAL ENCOUNTER (OUTPATIENT)
Age: 24
Setting detail: SPECIMEN
Discharge: HOME | End: 2024-11-27

## 2024-11-27 ENCOUNTER — ROUTINE PRENATAL (OUTPATIENT)
Dept: OBGYN | Age: 24
End: 2024-11-27

## 2024-11-27 VITALS
BODY MASS INDEX: 27.75 KG/M2 | RESPIRATION RATE: 16 BRPM | DIASTOLIC BLOOD PRESSURE: 62 MMHG | HEART RATE: 94 BPM | SYSTOLIC BLOOD PRESSURE: 110 MMHG | HEIGHT: 61 IN | OXYGEN SATURATION: 99 % | WEIGHT: 147 LBS

## 2024-11-27 DIAGNOSIS — R79.89 LOW SERUM VITAMIN D: ICD-10-CM

## 2024-11-27 DIAGNOSIS — Z34.93 PRENATAL CARE, THIRD TRIMESTER: ICD-10-CM

## 2024-11-27 DIAGNOSIS — Z34.92 PRENATAL CARE, SECOND TRIMESTER: Primary | ICD-10-CM

## 2024-11-27 DIAGNOSIS — Z3A.13 13 WEEKS GESTATION OF PREGNANCY: ICD-10-CM

## 2024-11-27 DIAGNOSIS — Z34.91 PRENATAL CARE, FIRST TRIMESTER: ICD-10-CM

## 2024-11-27 LAB
AMPHET UR QL SCN: NEGATIVE
BARBITURATES UR QL SCN: NEGATIVE
BENZODIAZ UR QL: NEGATIVE
BILIRUB UR QL STRIP: NEGATIVE
CANNABINOIDS UR QL SCN: NEGATIVE
CLARITY UR: CLEAR
COCAINE UR QL SCN: NEGATIVE
COLOR UR: YELLOW
COMMENT: ABNORMAL
FENTANYL UR QL: NEGATIVE
GLUCOSE UR STRIP-MCNC: NEGATIVE MG/DL
HGB UR QL STRIP.AUTO: NEGATIVE
KETONES UR STRIP-MCNC: NEGATIVE MG/DL
LEUKOCYTE ESTERASE UR QL STRIP: NEGATIVE
METHADONE UR QL: NEGATIVE
NITRITE UR QL STRIP: NEGATIVE
OPIATES UR QL SCN: NEGATIVE
OXYCODONE UR QL SCN: NEGATIVE
PCP UR QL SCN: NEGATIVE
PH UR STRIP: 6 [PH] (ref 5–6)
PROT UR STRIP-MCNC: NEGATIVE MG/DL
SP GR UR STRIP: 1.03 (ref 1.01–1.02)
TEST INFORMATION: NORMAL
UROBILINOGEN UR STRIP-ACNC: NORMAL EU/DL (ref 0–1)

## 2024-11-27 PROCEDURE — 99214 OFFICE O/P EST MOD 30 MIN: CPT

## 2024-11-27 PROCEDURE — 87086 URINE CULTURE/COLONY COUNT: CPT

## 2024-11-27 PROCEDURE — 81003 URINALYSIS AUTO W/O SCOPE: CPT

## 2024-11-27 PROCEDURE — 99214 OFFICE O/P EST MOD 30 MIN: CPT | Performed by: ADVANCED PRACTICE MIDWIFE

## 2024-11-27 PROCEDURE — 87591 N.GONORRHOEAE DNA AMP PROB: CPT

## 2024-11-27 PROCEDURE — 80307 DRUG TEST PRSMV CHEM ANLYZR: CPT

## 2024-11-27 PROCEDURE — 87491 CHLMYD TRACH DNA AMP PROBE: CPT

## 2024-11-27 RX ORDER — MULTIVIT-MIN/IRON/FOLIC ACID/K 18-600-40
2000 CAPSULE ORAL DAILY
Qty: 90 CAPSULE | Refills: 3 | Status: SHIPPED | OUTPATIENT
Start: 2024-11-27 | End: 2025-11-27

## 2024-11-28 LAB
MICROORGANISM SPEC CULT: NORMAL
SERVICE CMNT-IMP: NORMAL
SPECIMEN DESCRIPTION: NORMAL

## 2024-11-29 LAB
CHLAMYDIA DNA UR QL NAA+PROBE: NEGATIVE
N GONORRHOEA DNA UR QL NAA+PROBE: NEGATIVE
SPECIMEN DESCRIPTION: NORMAL

## 2024-12-02 NOTE — PROGRESS NOTES
S: Presents for prenatal visit today. Reports generally feeling well, less morning sickness and vomiting. Denies atypical vaginal discharge or bleeding. No LOF.   O:  MVSS, Afebrile. Abdomen gravid, nontender. S=D, +FHT's, Audible FM.   A:  23 yo  at 13 Weeks 4 Days sIUP, +FHT's  P:  Education: discussed and reviewed diet and hydration, PNV daily and start ASA 81 mg. Daily. RTO 4 weeks.  Thirty minutes spent in education, evaluation and assessment.

## 2024-12-10 ENCOUNTER — NURSE ONLY (OUTPATIENT)
Dept: LAB | Age: 24
End: 2024-12-10

## 2024-12-10 DIAGNOSIS — Z23 NEED FOR IMMUNIZATION AGAINST INFLUENZA: Primary | ICD-10-CM

## 2024-12-10 PROCEDURE — 90656 IIV3 VACC NO PRSV 0.5 ML IM: CPT | Performed by: ADVANCED PRACTICE MIDWIFE

## 2024-12-10 PROCEDURE — PBSHW INFLUENZA, AFLURIA TRIVALENT, (AGE 3 Y+), IM, PRESERVATIVE FREE, 0.5ML: Performed by: ADVANCED PRACTICE MIDWIFE

## 2024-12-23 ENCOUNTER — ROUTINE PRENATAL (OUTPATIENT)
Dept: OBGYN | Age: 24
End: 2024-12-23
Payer: COMMERCIAL

## 2024-12-23 ENCOUNTER — HOSPITAL ENCOUNTER (OUTPATIENT)
Age: 24
Discharge: HOME | End: 2024-12-23
Payer: COMMERCIAL

## 2024-12-23 VITALS
WEIGHT: 146 LBS | DIASTOLIC BLOOD PRESSURE: 76 MMHG | SYSTOLIC BLOOD PRESSURE: 116 MMHG | HEART RATE: 69 BPM | BODY MASS INDEX: 27.28 KG/M2

## 2024-12-23 DIAGNOSIS — Z34.03 PRENATAL CARE, FIRST PREGNANCY IN THIRD TRIMESTER: ICD-10-CM

## 2024-12-23 DIAGNOSIS — Z34.92 PRENATAL CARE, SECOND TRIMESTER: ICD-10-CM

## 2024-12-23 DIAGNOSIS — Z34.92 PRENATAL CARE, SECOND TRIMESTER: Primary | ICD-10-CM

## 2024-12-23 DIAGNOSIS — Z3A.17 17 WEEKS GESTATION OF PREGNANCY: ICD-10-CM

## 2024-12-23 PROCEDURE — 82105 ALPHA-FETOPROTEIN SERUM: CPT

## 2024-12-23 PROCEDURE — 36415 COLL VENOUS BLD VENIPUNCTURE: CPT

## 2024-12-23 PROCEDURE — 99214 OFFICE O/P EST MOD 30 MIN: CPT

## 2024-12-23 NOTE — PROGRESS NOTES
S: Presents today for prenatal visit  Reports occasional flutters, nothing regular. Denies atypical discharge, bleeding, loss of fluid, no issues with bowel or bladder. Fatigue.  O:  MVSS, Afebrile. Abdomen gravid, nontender. +FHT's, Audible FM  A:  23 yo  at 17 Weeks 2 Days SIUP, +FHT's  P:  Education: Discussed and reviewed MSAFP today, PNV daily, anatomy US in 4 weeks. RTO 4 weeks. Thirty minutes spent in education, evaluation and assessment.

## 2024-12-27 LAB
AFP INTERPRETATION: NORMAL
AFP MOM: 0.55
AFP SPECIMEN: NORMAL
AFP: 23 NG/ML
DATE OF BIRTH: NORMAL
DATING METHOD: NORMAL
DETERMINED BY: NORMAL
DIABETIC: NEGATIVE
DONOR EGG?: NORMAL
DUE DATE: NORMAL
ESTIMATED DUE DATE: NORMAL
FAMILY HISTORY NTD: NEGATIVE
GESTATIONAL AGE: NORMAL
IN VITRO FERTILIZATION: NORMAL
INSULIN REQ DIABETES: NO
LAST MENSTRUAL PERIOD: NORMAL
MATERNAL AGE AT EDD: 24.8 YR
MATERNAL WEIGHT: 146
MONOCHORIONIC TWINS: NORMAL
NUMBER OF FETUSES: NORMAL
PATIENT WEIGHT UNITS: NORMAL
PATIENT WEIGHT: NORMAL
RACE (MATERNAL): NORMAL
RACE: NORMAL
REPEAT SPECIMEN?: NORMAL
SMOKING: NORMAL
SMOKING: NORMAL
VALPROIC/CARBAMAZEP: NORMAL
ZZ NTE CLEAN UP: HISTORY: NO

## 2025-01-13 ENCOUNTER — ROUTINE PRENATAL (OUTPATIENT)
Dept: OBGYN | Age: 25
End: 2025-01-13
Payer: COMMERCIAL

## 2025-01-13 ENCOUNTER — HOSPITAL ENCOUNTER (OUTPATIENT)
Dept: ULTRASOUND IMAGING | Age: 25
Discharge: HOME | End: 2025-01-15
Payer: COMMERCIAL

## 2025-01-13 VITALS
DIASTOLIC BLOOD PRESSURE: 54 MMHG | WEIGHT: 152 LBS | HEART RATE: 60 BPM | BODY MASS INDEX: 28.4 KG/M2 | SYSTOLIC BLOOD PRESSURE: 88 MMHG

## 2025-01-13 DIAGNOSIS — Z34.92 PRENATAL CARE, SECOND TRIMESTER: ICD-10-CM

## 2025-01-13 DIAGNOSIS — Z34.92 PRENATAL CARE, SECOND TRIMESTER: Primary | ICD-10-CM

## 2025-01-13 DIAGNOSIS — Z3A.20 20 WEEKS GESTATION OF PREGNANCY: ICD-10-CM

## 2025-01-13 PROCEDURE — 76805 OB US >/= 14 WKS SNGL FETUS: CPT

## 2025-01-13 PROCEDURE — 1036F TOBACCO NON-USER: CPT | Performed by: ADVANCED PRACTICE MIDWIFE

## 2025-01-13 PROCEDURE — G8419 CALC BMI OUT NRM PARAM NOF/U: HCPCS | Performed by: ADVANCED PRACTICE MIDWIFE

## 2025-01-13 PROCEDURE — 99214 OFFICE O/P EST MOD 30 MIN: CPT | Performed by: ADVANCED PRACTICE MIDWIFE

## 2025-01-13 PROCEDURE — G8427 DOCREV CUR MEDS BY ELIG CLIN: HCPCS | Performed by: ADVANCED PRACTICE MIDWIFE

## 2025-01-16 NOTE — PROGRESS NOTES
S: Presents for prenatal visit today. Reports that she is feeling well and no concerns at this time. Baby is active, no atypical discharge or bleeding.   O:  MVSS, Afebrile. Abdomen gravid, nontender.  US = 20 Weeks 0 Days +/- 1 Week 3 Days = EDC 2025, .75 g., +/- 50.66 g., (12 oz. +/- 2 oz), 39.7%, CELIO 11.69 cm.,   A:  25 yo  at 20 Weeks 2 Days SIUP, +FHT's  P:  Education: discussed and reviewed US, reviewed grwoth US at 32 weeks. Continue with PNV daily.  Increase hydration and small salty snacks.  Thirty minutes spent in education, evaluation and assessment.

## 2025-01-21 ENCOUNTER — ROUTINE PRENATAL (OUTPATIENT)
Dept: OBGYN | Age: 25
End: 2025-01-21
Payer: COMMERCIAL

## 2025-01-21 VITALS
SYSTOLIC BLOOD PRESSURE: 98 MMHG | HEART RATE: 88 BPM | WEIGHT: 157 LBS | DIASTOLIC BLOOD PRESSURE: 60 MMHG | BODY MASS INDEX: 29.34 KG/M2

## 2025-01-21 DIAGNOSIS — Z3A.21 21 WEEKS GESTATION OF PREGNANCY: ICD-10-CM

## 2025-01-21 DIAGNOSIS — Z34.92 PRENATAL CARE, SECOND TRIMESTER: Primary | ICD-10-CM
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
## 2025-01-21 PROCEDURE — G8427 DOCREV CUR MEDS BY ELIG CLIN: HCPCS | Performed by: ADVANCED PRACTICE MIDWIFE

## 2025-01-21 PROCEDURE — 99214 OFFICE O/P EST MOD 30 MIN: CPT | Performed by: ADVANCED PRACTICE MIDWIFE

## 2025-01-21 PROCEDURE — 99211 OFF/OP EST MAY X REQ PHY/QHP: CPT

## 2025-01-21 PROCEDURE — 1036F TOBACCO NON-USER: CPT | Performed by: ADVANCED PRACTICE MIDWIFE

## 2025-01-21 PROCEDURE — G8419 CALC BMI OUT NRM PARAM NOF/U: HCPCS | Performed by: ADVANCED PRACTICE MIDWIFE

## 2025-01-21 NOTE — PROGRESS NOTES
S: Presents today with report of falling down the stairs, 9-10 steps when carrying the laundry down the stairs. She denies being pushed or any type of altercation. She reports she was in house slippers and tripped.  O:  MVSS, Afebrile. Abdomen gravid, nontender. S=D, No uterine activity noted. Note ecchymosis of left forearm and left buttock.  A:  25 yo  at 21 Weeks 3 Days SIUP, +FHT's, Fall  P:  Education: discussed and reviewed danger S&S, when to call. RTO as scheduled.  Thirty minutes spent in education, evaluation and assessment.

## 2025-02-12 ENCOUNTER — ROUTINE PRENATAL (OUTPATIENT)
Dept: OBGYN | Age: 25
End: 2025-02-12
Payer: COMMERCIAL

## 2025-02-12 VITALS
HEIGHT: 61 IN | OXYGEN SATURATION: 98 % | WEIGHT: 157.6 LBS | HEART RATE: 87 BPM | SYSTOLIC BLOOD PRESSURE: 108 MMHG | DIASTOLIC BLOOD PRESSURE: 64 MMHG | BODY MASS INDEX: 29.76 KG/M2

## 2025-02-12 DIAGNOSIS — Z34.92 PRENATAL CARE, SECOND TRIMESTER: Primary | ICD-10-CM

## 2025-02-12 DIAGNOSIS — R79.89 LOW SERUM VITAMIN D: ICD-10-CM

## 2025-02-12 DIAGNOSIS — Z3A.24 24 WEEKS GESTATION OF PREGNANCY: ICD-10-CM

## 2025-02-12 PROCEDURE — 1036F TOBACCO NON-USER: CPT | Performed by: ADVANCED PRACTICE MIDWIFE

## 2025-02-12 PROCEDURE — G8419 CALC BMI OUT NRM PARAM NOF/U: HCPCS | Performed by: ADVANCED PRACTICE MIDWIFE

## 2025-02-12 PROCEDURE — G8427 DOCREV CUR MEDS BY ELIG CLIN: HCPCS | Performed by: ADVANCED PRACTICE MIDWIFE

## 2025-02-12 PROCEDURE — 99214 OFFICE O/P EST MOD 30 MIN: CPT | Performed by: ADVANCED PRACTICE MIDWIFE

## 2025-02-12 NOTE — PROGRESS NOTES
S: Presents for prenatal visit today. Reports baby is active and no contractions. No atypical discharge, bleeding or loss of fluid. Perceiving fetal movements. Generally feeling well.  O:  MVSS, Afebrile. Abdomen gravid, nontender. S=D, +FHT's, +FM  A:  23 yo  at 24 Weeks 4 Days SIUP, +FHT's  P:  Education: discussed and reviewed 1/2#/week weight gain, hydration, 28 week labs and Tdap at NV. RTO 4 weeks. Thirty minutes spent in education, evaluation and assessment

## 2025-03-11 ENCOUNTER — RESULTS FOLLOW-UP (OUTPATIENT)
Dept: OBGYN | Age: 25
End: 2025-03-11

## 2025-03-11 ENCOUNTER — ROUTINE PRENATAL (OUTPATIENT)
Dept: OBGYN | Age: 25
End: 2025-03-11
Payer: COMMERCIAL

## 2025-03-11 ENCOUNTER — HOSPITAL ENCOUNTER (OUTPATIENT)
Age: 25
Discharge: HOME | End: 2025-03-11
Payer: COMMERCIAL

## 2025-03-11 ENCOUNTER — NURSE ONLY (OUTPATIENT)
Dept: LAB | Age: 25
End: 2025-03-11
Payer: COMMERCIAL

## 2025-03-11 VITALS
SYSTOLIC BLOOD PRESSURE: 110 MMHG | HEART RATE: 111 BPM | BODY MASS INDEX: 30.27 KG/M2 | WEIGHT: 162 LBS | DIASTOLIC BLOOD PRESSURE: 52 MMHG

## 2025-03-11 DIAGNOSIS — J02.9 ACUTE PHARYNGITIS, UNSPECIFIED ETIOLOGY: ICD-10-CM

## 2025-03-11 DIAGNOSIS — Z23 NEED FOR TDAP VACCINATION: Primary | ICD-10-CM

## 2025-03-11 DIAGNOSIS — Z34.92 PRENATAL CARE, SECOND TRIMESTER: ICD-10-CM

## 2025-03-11 DIAGNOSIS — O99.019 ANEMIA DURING PREGNANCY: ICD-10-CM

## 2025-03-11 DIAGNOSIS — Z34.92 PRENATAL CARE, SECOND TRIMESTER: Primary | ICD-10-CM

## 2025-03-11 DIAGNOSIS — Z23 NEED FOR DIPHTHERIA-TETANUS-PERTUSSIS (TDAP) VACCINE: ICD-10-CM

## 2025-03-11 DIAGNOSIS — K59.00 CONSTIPATION DURING PREGNANCY, ANTEPARTUM: ICD-10-CM

## 2025-03-11 DIAGNOSIS — O99.619 CONSTIPATION DURING PREGNANCY, ANTEPARTUM: ICD-10-CM

## 2025-03-11 DIAGNOSIS — Z3A.28 28 WEEKS GESTATION OF PREGNANCY: ICD-10-CM

## 2025-03-11 LAB
AMOUNT GLUCOSE GIVEN: 50 G
BASOPHILS # BLD: 0.03 K/UL (ref 0–0.2)
BASOPHILS NFR BLD: 0 % (ref 0–2)
EOSINOPHIL # BLD: 0.07 K/UL (ref 0–0.44)
EOSINOPHILS RELATIVE PERCENT: 1 % (ref 1–4)
ERYTHROCYTE [DISTWIDTH] IN BLOOD BY AUTOMATED COUNT: 12.8 % (ref 11.8–14.4)
GLUCOSE 3H P 100 G GLC PO SERPL-MCNC: 131 MG/DL (ref 65–179)
HCT VFR BLD AUTO: 30.4 % (ref 36.3–47.1)
HGB BLD-MCNC: 10.3 G/DL (ref 11.9–15.1)
IMM GRANULOCYTES # BLD AUTO: 0.06 K/UL (ref 0–0.3)
IMM GRANULOCYTES NFR BLD: 1 %
LYMPHOCYTES NFR BLD: 0.78 K/UL (ref 1.1–3.7)
LYMPHOCYTES RELATIVE PERCENT: 7 % (ref 24–43)
MCH RBC QN AUTO: 28.8 PG (ref 25.2–33.5)
MCHC RBC AUTO-ENTMCNC: 33.9 G/DL (ref 25.2–33.5)
MCV RBC AUTO: 84.9 FL (ref 82.6–102.9)
MONOCYTES NFR BLD: 0.69 K/UL (ref 0.1–1.2)
MONOCYTES NFR BLD: 7 % (ref 3–12)
NEUTROPHILS NFR BLD: 84 % (ref 36–65)
NEUTS SEG NFR BLD: 9.04 K/UL (ref 1.5–8.1)
NRBC BLD-RTO: 0 PER 100 WBC
PLATELET # BLD AUTO: 219 K/UL (ref 138–453)
PMV BLD AUTO: 11.4 FL (ref 8.1–13.5)
RBC # BLD AUTO: 3.58 M/UL (ref 3.95–5.11)
WBC OTHER # BLD: 10.7 K/UL (ref 3.5–11.3)

## 2025-03-11 PROCEDURE — PBSHW TDAP, BOOSTRIX, (AGE 10 YRS+), IM: Performed by: FAMILY MEDICINE

## 2025-03-11 PROCEDURE — G8419 CALC BMI OUT NRM PARAM NOF/U: HCPCS | Performed by: ADVANCED PRACTICE MIDWIFE

## 2025-03-11 PROCEDURE — 90715 TDAP VACCINE 7 YRS/> IM: CPT | Performed by: FAMILY MEDICINE

## 2025-03-11 PROCEDURE — 99214 OFFICE O/P EST MOD 30 MIN: CPT | Performed by: ADVANCED PRACTICE MIDWIFE

## 2025-03-11 PROCEDURE — 85025 COMPLETE CBC W/AUTO DIFF WBC: CPT

## 2025-03-11 PROCEDURE — 1036F TOBACCO NON-USER: CPT | Performed by: ADVANCED PRACTICE MIDWIFE

## 2025-03-11 PROCEDURE — G8427 DOCREV CUR MEDS BY ELIG CLIN: HCPCS | Performed by: ADVANCED PRACTICE MIDWIFE

## 2025-03-11 PROCEDURE — 82947 ASSAY GLUCOSE BLOOD QUANT: CPT

## 2025-03-11 PROCEDURE — 36415 COLL VENOUS BLD VENIPUNCTURE: CPT

## 2025-03-11 RX ORDER — PSEUDOEPHEDRINE HCL 30 MG/1
30 TABLET, FILM COATED ORAL EVERY 6 HOURS PRN
Qty: 24 TABLET | Refills: 1 | Status: SHIPPED | OUTPATIENT
Start: 2025-03-11 | End: 2026-03-11

## 2025-03-11 RX ORDER — DOCUSATE SODIUM 100 MG/1
100 CAPSULE, LIQUID FILLED ORAL 2 TIMES DAILY
Qty: 60 CAPSULE | Refills: 2 | Status: SHIPPED | OUTPATIENT
Start: 2025-03-11

## 2025-03-11 RX ORDER — POLYETHYLENE GLYCOL 3350 17 G/17G
17 POWDER, FOR SOLUTION ORAL AS NEEDED
Qty: 510 G | Refills: 0 | Status: SHIPPED | OUTPATIENT
Start: 2025-03-11 | End: 2025-06-09

## 2025-03-12 NOTE — RESULT ENCOUNTER NOTE
I reviewed results with patient and sent in medication for anemia.  Glucose, one point over, will for go 3 H GTT, minimize simple sugars. DA/CNM
Noted.
no epitaxis

## 2025-03-12 NOTE — PROGRESS NOTES
S: Presents for prenatal visit today. Reports baby is active and occasional BH contractions. Reports starting with sinus congestion and runny nose. Denies fever or chills. No atypical discharge, bleeding or loss of fluid.  O:  MVSS, Afebrile. Abdomen gravid, nontender. S=D, +FHT's, +FM  A:  25 yo  at 28 Weeks 3 Days SIUP, Breech Presentation, +FHT's  P:  Education: discussed and reviewed safe medication for sinus congestion, sudafed and tylenol, increase hydration, FMI, PTL, danger S&S. Continue with PNV daily. Colace daily for constipation and miralax as needed. 28 week labs and Tdap today. Thirty minutes spent in education, evaluation and assessment.

## 2025-03-27 ENCOUNTER — HOSPITAL ENCOUNTER (OUTPATIENT)
Age: 25
Setting detail: SPECIMEN
Discharge: HOME | End: 2025-03-27
Payer: COMMERCIAL

## 2025-03-27 ENCOUNTER — ROUTINE PRENATAL (OUTPATIENT)
Dept: OBGYN | Age: 25
End: 2025-03-27
Payer: COMMERCIAL

## 2025-03-27 VITALS
HEART RATE: 86 BPM | WEIGHT: 165 LBS | SYSTOLIC BLOOD PRESSURE: 123 MMHG | BODY MASS INDEX: 30.83 KG/M2 | DIASTOLIC BLOOD PRESSURE: 77 MMHG

## 2025-03-27 DIAGNOSIS — R10.9 ABDOMINAL PAIN AFFECTING PREGNANCY: ICD-10-CM

## 2025-03-27 DIAGNOSIS — Z34.93 PRENATAL CARE IN THIRD TRIMESTER: ICD-10-CM

## 2025-03-27 DIAGNOSIS — Z34.93 PRENATAL CARE IN THIRD TRIMESTER: Primary | ICD-10-CM

## 2025-03-27 DIAGNOSIS — Z3A.30 30 WEEKS GESTATION OF PREGNANCY: ICD-10-CM

## 2025-03-27 DIAGNOSIS — O26.899 ABDOMINAL PAIN AFFECTING PREGNANCY: ICD-10-CM

## 2025-03-27 LAB
BACTERIA URNS QL MICRO: ABNORMAL
BILIRUB UR QL STRIP: NEGATIVE
CHARACTER UR: ABNORMAL
CLARITY UR: CLEAR
COLOR UR: YELLOW
EPI CELLS #/AREA URNS HPF: ABNORMAL /HPF (ref 0–5)
GLUCOSE UR STRIP-MCNC: NEGATIVE MG/DL
HGB UR QL STRIP.AUTO: NEGATIVE
KETONES UR STRIP-MCNC: NEGATIVE MG/DL
LEUKOCYTE ESTERASE UR QL STRIP: ABNORMAL
NITRITE UR QL STRIP: NEGATIVE
PH UR STRIP: 6 [PH] (ref 5–6)
PROT UR STRIP-MCNC: NEGATIVE MG/DL
RBC #/AREA URNS HPF: ABNORMAL /HPF (ref 0–4)
SP GR UR STRIP: 1 (ref 1.01–1.02)
UROBILINOGEN UR STRIP-ACNC: NORMAL EU/DL (ref 0–1)
WBC #/AREA URNS HPF: ABNORMAL /HPF (ref 0–4)

## 2025-03-27 PROCEDURE — 59025 FETAL NON-STRESS TEST: CPT | Performed by: ADVANCED PRACTICE MIDWIFE

## 2025-03-27 PROCEDURE — 99214 OFFICE O/P EST MOD 30 MIN: CPT | Performed by: ADVANCED PRACTICE MIDWIFE

## 2025-03-27 PROCEDURE — 1036F TOBACCO NON-USER: CPT | Performed by: ADVANCED PRACTICE MIDWIFE

## 2025-03-27 PROCEDURE — 81001 URINALYSIS AUTO W/SCOPE: CPT

## 2025-03-27 PROCEDURE — G8427 DOCREV CUR MEDS BY ELIG CLIN: HCPCS | Performed by: ADVANCED PRACTICE MIDWIFE

## 2025-03-27 PROCEDURE — 87086 URINE CULTURE/COLONY COUNT: CPT

## 2025-03-27 PROCEDURE — G8419 CALC BMI OUT NRM PARAM NOF/U: HCPCS | Performed by: ADVANCED PRACTICE MIDWIFE

## 2025-03-27 NOTE — PROGRESS NOTES
S: Presents for prenatal visit today. Reports increasing abdominal tightening and discomfort. Baby is very active and denies atypical discharge, bleeding or loss of fluid.  O:  MVSS, Afebrile. Abdomen gravid, nontender. S=D, +FHT's, +FM, EFM: , moderate variability, accels present, decels absent, contractions absent. SVE deferred. UA: Negative  A:  25 yo  at 30 Weeks 5 Days SIUP, Advancing Discomforts of Pregnancy  P:  Education: discussed and reviewed diet and hydration, danger S&S, when to call and how to contact provider. RTO 2 week. Thirty minutes spent in education, evaluation and assessment

## 2025-03-28 ENCOUNTER — RESULTS FOLLOW-UP (OUTPATIENT)
Dept: OBGYN | Age: 25
End: 2025-03-28

## 2025-03-28 LAB
MICROORGANISM SPEC CULT: NO GROWTH
SERVICE CMNT-IMP: NORMAL
SPECIMEN DESCRIPTION: NORMAL

## 2025-04-10 ENCOUNTER — ROUTINE PRENATAL (OUTPATIENT)
Dept: OBGYN | Age: 25
End: 2025-04-10
Payer: COMMERCIAL

## 2025-04-10 ENCOUNTER — HOSPITAL ENCOUNTER (OUTPATIENT)
Dept: ULTRASOUND IMAGING | Age: 25
Discharge: HOME | End: 2025-04-12
Payer: COMMERCIAL

## 2025-04-10 VITALS
OXYGEN SATURATION: 99 % | WEIGHT: 168.4 LBS | HEART RATE: 88 BPM | BODY MASS INDEX: 31.79 KG/M2 | HEIGHT: 61 IN | SYSTOLIC BLOOD PRESSURE: 98 MMHG | DIASTOLIC BLOOD PRESSURE: 58 MMHG

## 2025-04-10 DIAGNOSIS — Z34.93 PRENATAL CARE, THIRD TRIMESTER: ICD-10-CM

## 2025-04-10 DIAGNOSIS — Z3A.32 32 WEEKS GESTATION OF PREGNANCY: ICD-10-CM

## 2025-04-10 DIAGNOSIS — Z34.83 PRENATAL CARE, SUBSEQUENT PREGNANCY IN THIRD TRIMESTER: Primary | ICD-10-CM

## 2025-04-10 PROCEDURE — G8419 CALC BMI OUT NRM PARAM NOF/U: HCPCS | Performed by: ADVANCED PRACTICE MIDWIFE

## 2025-04-10 PROCEDURE — 76805 OB US >/= 14 WKS SNGL FETUS: CPT

## 2025-04-10 PROCEDURE — 1036F TOBACCO NON-USER: CPT | Performed by: ADVANCED PRACTICE MIDWIFE

## 2025-04-10 PROCEDURE — G8427 DOCREV CUR MEDS BY ELIG CLIN: HCPCS | Performed by: ADVANCED PRACTICE MIDWIFE

## 2025-04-10 PROCEDURE — 99214 OFFICE O/P EST MOD 30 MIN: CPT | Performed by: ADVANCED PRACTICE MIDWIFE

## 2025-04-10 NOTE — PROGRESS NOTES
S: Presents for prenatal visit today. Reports baby is active and note occasional contractions. More comfortable since baby is head down. Denies atypical discharge, bleeding or loss of fluid.  O:  MVSS, Afebrile. Abdomen gravid, nontender. S=D, US = 32 Weeks 5 Days +/- 2 Weeks 2 Days = EDC 2025, EFW 2037 g. +/- 305.55 g., (4# 8 oz. +/- 11 oz.), 40.4%, CELIO 14.26 cm.,   A:  23 yo  at 32 Weeks 5 Days SIUP, +FHT's, Growth CWD  P:  Education: discussed and reviewed US, reviewed birth and her preferences to consider, FMI, PTL, Sanjuana Savage for PEDs. RTO 2 weeks. Thirty minutes spent in education, evaluation and assessment.

## 2025-04-14 ENCOUNTER — RESULTS FOLLOW-UP (OUTPATIENT)
Dept: OBGYN | Age: 25
End: 2025-04-14

## 2025-04-14 NOTE — RESULT ENCOUNTER NOTE
US results WNL. I reviewed with patient. [facility] CONSULT - Dr. Vieyra     CHIEF COMPLAINT: SOB     HPI:  78 y/o female, with a PmHx of COPD (on 2-3L O2 prn), paroxsymal a-fib (on pradaxa), CAD (s/p stent 11/2018), DCHF, HTN, HLD, and anxiety, presenting to the [facility] ED with right foot pressure ulcer pain x several weeks. The patient has a dime-sized ulcer with scant serosanguinous drainage on her right calcaneous that she acquired from a shoe weeks ago. She reports that she woke up last night and started to experience SOB and midsternal chest discomfort associated with palpitations mostly when lying flat. She also reports worseening b/l le edema and JACKSON with walking 1/2 block. She also endorses severe right foot pain 10/10. The patient denies fever, chills, SOB at rest, diaphoresis, dizziness, claudication, wheezing, changes in vision and hearing, abdominal pain, change in bowel and urinary habits, dysuria, hematuria. Currently resting comfortably, no cp or sob at this time.     PAST MEDICAL & SURGICAL HISTORY:  CAD (coronary artery disease): s/p stent 2018  CHF (congestive heart failure)  COPD (chronic obstructive pulmonary disease): on 2-3L O2 at home prn  Anxiety  TIA (transient ischemic attack): many years ago  PAF (paroxysmal atrial fibrillation)  HLD (hyperlipidemia)  HTN (hypertension)  H/O total hysterectomy: 2014  History of cataract surgery: b/l 2015  History of repair of hip fracture: luisa placed in RLE 2015  H/O spinal fusion: c2-6 in 2017          PREVIOUS DIAGNOSTIC TESTING:    [ ] Echocardiogram: < from: Transthoracic Echocardiogram (12.21.18 @ 09:38) >  Conclusions:  1. Mitral annular calcification, with thickened leaflets.  Mild mitral regurgitation.  2. Severely dilated left atrium.  LA volume index = 57  cc/m2.  3. Mild concentric left ventricular hypertrophy.  4. Normal left ventricular systolic function.  5. Right ventricular enlargement with decreased right  ventricular systolic function.  6. Normal tricuspid valve. Moderate tricuspid  regurgitation.  7. Estimated pulmonary artery systolic pressure equals 52  mm Hg, assuming right atrial pressure equals 8 mm Hg,  consistent with moderate pulmonary pressures.  *** No previous Echo exam.    < end of copied text >  < from: Transthoracic Echocardiogram (12.21.18 @ 09:38) >  EF (Teicholtz): 55 %    < end of copied text >    [ ]  Catheterization:   [ ] Stress Test:  	    MEDICATIONS:  MEDICATIONS  (STANDING):  atorvastatin 40 milliGRAM(s) Oral at bedtime  buDESOnide  80 MICROgram(s)/formoterol 4.5 MICROgram(s) Inhaler 2 Puff(s) Inhalation two times a day  clopidogrel Tablet 75 milliGRAM(s) Oral daily  furosemide   Injectable 40 milliGRAM(s) IV Push every 12 hours  metoprolol tartrate 12.5 milliGRAM(s) Oral every 12 hours  pantoprazole  Injectable 40 milliGRAM(s) IV Push two times a day  senna 2 Tablet(s) Oral at bedtime  vancomycin    Solution 125 milliGRAM(s) Oral every 6 hours      FAMILY HISTORY:  No pertinent family history in first degree relatives      SOCIAL HISTORY:    [ ] Non-smoker  [X ] former Smoker  [ ] Alcohol    Allergies    No Known Allergies    Intolerances    	    REVIEW OF SYSTEMS:  CONSTITUTIONAL: No fever, weight loss, or fatigue  EYES: No eye pain, visual disturbances, or discharge  ENMT:  No difficulty hearing, tinnitus, vertigo; No sinus or throat pain  NECK: No pain or stiffness  RESPIRATORY: No cough, wheezing, chills or hemoptysis; + Shortness of Breath, +jackson   CARDIOVASCULAR: No chest pain, palpitations, passing out, dizziness, +leg swelling  GASTROINTESTINAL: No abdominal or epigastric pain. No nausea, vomiting, or hematemesis; No diarrhea or constipation. No melena or hematochezia.  GENITOURINARY: No dysuria, frequency, hematuria, or incontinence  NEUROLOGICAL: No headaches, memory loss, loss of strength, numbness, or tremors  SKIN: No itching, burning, rashes, or lesions   	+ R leg pain, + ulcer     [x ] All others negative	  [ ] Unable to obtain    PHYSICAL EXAM:  T(C): 35.5 (01-17-19 @ 13:03), Max: 36 (01-17-19 @ 10:45)  HR: 112 (01-17-19 @ 13:03) (86 - 123)  BP: 105/58 (01-17-19 @ 13:03) (85/47 - 105/58)  RR: 16 (01-17-19 @ 12:03) (16 - 28)  SpO2: 100% (01-17-19 @ 12:03) (92% - 100%)  Wt(kg): --  I&O's Summary      Appearance: Normal	  Psychiatry: A & O x 3, Mood & affect appropriate  HEENT:   Normal oral mucosa, PERRL, EOMI	  Lymphatic: No lymphadenopathy  Cardiovascular: Normal S1 S2, irreg,   Respiratory: Lungs clear to auscultation	  Gastrointestinal:  Soft, Non-tender, + BS	  Skin: No rashes, No ecchymoses, No cyanosis	  Neurologic: Non-focal  Extremities: Normal range of motion, No clubbing, b/l +2 le edema, +RLE dsg   Vascular: Peripheral pulses palpable 2+ bilaterally    TELEMETRY: AFIB  ECG:  afib 102  RADIOLOGY:   < from: Xray Chest 1 View- PORTABLE-Urgent (01.17.19 @ 06:48) >    FINDINGS:  Increased fine reticular opacities likely pulmonary edema. No focal   consolidations. No pleural effusion or pneumothorax. Cardiac silhouette   is not easily evaluated on this view but appears unchanged. Degenerative   disease of the thoracic spine.    IMPRESSION:   Mild interstitial edema.    < end of copied text >  OTHER: 	  	  LABS:	 	    CARDIAC MARKERS:                                  8.0    7.82  )-----------( 241      ( 17 Jan 2019 09:20 )             25.6     01-17    134<L>  |  96<L>  |  36<H>  ----------------------------<  92  4.1   |  26  |  1.68<H>    Ca    8.6      17 Jan 2019 06:10    TPro  6.4  /  Alb  2.9<L>  /  TBili  0.6  /  DBili  x   /  AST  15  /  ALT  10  /  AlkPhos  115  01-17    PT/INR - ( 17 Jan 2019 06:10 )   PT: 36.3 SEC;   INR: 3.08          PTT - ( 17 Jan 2019 06:10 )  PTT:121.0 SEC  proBNP: Serum Pro-Brain Natriuretic Peptide: 28800 pg/mL (01-17 @ 09:20)    Lipid Profile:   HgA1c:   TSH: CARDIOLOGY CONSULT - Dr. Vieyra     CHIEF COMPLAINT: SOB     HPI:  78 y/o female, with a PmHx of COPD (on 2-3L O2 prn), paroxsymal a-fib (on pradaxa), CAD (s/p stent 11/2018), DCHF, HTN, HLD, and anxiety, presenting to the [facility] ED with right foot pressure ulcer pain x several weeks. The patient has a dime-sized ulcer with scant serosanguinous drainage on her right calcaneous that she acquired from a shoe weeks ago. She reports that she woke up last night and started to experience SOB and midsternal chest discomfort associated with palpitations mostly when lying flat. She also reports worseening b/l le edema and JACKSON with walking 1/2 block. She also endorses severe right foot pain 10/10. The patient denies fever, chills, SOB at rest, diaphoresis, dizziness, claudication, wheezing, changes in vision and hearing, abdominal pain, change in bowel and urinary habits, dysuria, hematuria. Currently resting comfortably, no cp or sob at this time.     PAST MEDICAL & SURGICAL HISTORY:  CAD (coronary artery disease): s/p stent 2018  CHF (congestive heart failure)  COPD (chronic obstructive pulmonary disease): on 2-3L O2 at home prn  Anxiety  TIA (transient ischemic attack): many years ago  PAF (paroxysmal atrial fibrillation)  HLD (hyperlipidemia)  HTN (hypertension)  H/O total hysterectomy: 2014  History of cataract surgery: b/l 2015  History of repair of hip fracture: luisa placed in RLE 2015  H/O spinal fusion: c2-6 in 2017          PREVIOUS DIAGNOSTIC TESTING:    [ ] Echocardiogram: < from: Transthoracic Echocardiogram (12.21.18 @ 09:38) >  Conclusions:  1. Mitral annular calcification, with thickened leaflets.  Mild mitral regurgitation.  2. Severely dilated left atrium.  LA volume index = 57  cc/m2.  3. Mild concentric left ventricular hypertrophy.  4. Normal left ventricular systolic function.  5. Right ventricular enlargement with decreased right  ventricular systolic function.  6. Normal tricuspid valve. Moderate tricuspid  regurgitation.  7. Estimated pulmonary artery systolic pressure equals 52  mm Hg, assuming right atrial pressure equals 8 mm Hg,  consistent with moderate pulmonary pressures.  *** No previous Echo exam.    < end of copied text >  < from: Transthoracic Echocardiogram (12.21.18 @ 09:38) >  EF (Teicholtz): 55 %    < end of copied text >    [ ]  Catheterization:   [ ] Stress Test:  	    MEDICATIONS:  MEDICATIONS  (STANDING):  atorvastatin 40 milliGRAM(s) Oral at bedtime  buDESOnide  80 MICROgram(s)/formoterol 4.5 MICROgram(s) Inhaler 2 Puff(s) Inhalation two times a day  clopidogrel Tablet 75 milliGRAM(s) Oral daily  furosemide   Injectable 40 milliGRAM(s) IV Push every 12 hours  metoprolol tartrate 12.5 milliGRAM(s) Oral every 12 hours  pantoprazole  Injectable 40 milliGRAM(s) IV Push two times a day  senna 2 Tablet(s) Oral at bedtime  vancomycin    Solution 125 milliGRAM(s) Oral every 6 hours      FAMILY HISTORY:  No pertinent family history in first degree relatives      SOCIAL HISTORY:    [ ] Non-smoker  [X ] former Smoker  [ ] Alcohol    Allergies    No Known Allergies    Intolerances    	    REVIEW OF SYSTEMS:  CONSTITUTIONAL: No fever, weight loss, or fatigue  EYES: No eye pain, visual disturbances, or discharge  ENMT:  No difficulty hearing, tinnitus, vertigo; No sinus or throat pain  NECK: No pain or stiffness  RESPIRATORY: No cough, wheezing, chills or hemoptysis; + Shortness of Breath, +jackson   CARDIOVASCULAR: No chest pain, palpitations, passing out, dizziness, +leg swelling  GASTROINTESTINAL: No abdominal or epigastric pain. No nausea, vomiting, or hematemesis; No diarrhea or constipation. No melena or hematochezia.  GENITOURINARY: No dysuria, frequency, hematuria, or incontinence  NEUROLOGICAL: No headaches, memory loss, loss of strength, numbness, or tremors  SKIN: No itching, burning, rashes, or lesions   	+ R leg pain, + ulcer     [x ] All others negative	  [ ] Unable to obtain    PHYSICAL EXAM:  T(C): 35.5 (01-17-19 @ 13:03), Max: 36 (01-17-19 @ 10:45)  HR: 112 (01-17-19 @ 13:03) (86 - 123)  BP: 105/58 (01-17-19 @ 13:03) (85/47 - 105/58)  RR: 16 (01-17-19 @ 12:03) (16 - 28)  SpO2: 100% (01-17-19 @ 12:03) (92% - 100%)  Wt(kg): --  I&O's Summary      Appearance: Normal	  Psychiatry: A & O x 3, Mood & affect appropriate  HEENT:   Normal oral mucosa, PERRL, EOMI	  Lymphatic: No lymphadenopathy  Cardiovascular: Normal S1 S2, irreg,   Respiratory: crackles   Gastrointestinal:  Soft, Non-tender, + BS	  Skin: No rashes, No ecchymoses, No cyanosis	  Neurologic: Non-focal  Extremities: Normal range of motion, No clubbing, b/l +2 le edema, +RLE dsg   Vascular: Peripheral pulses palpable 2+ bilaterally    TELEMETRY: AFIB  ECG:  afib 102  RADIOLOGY:   < from: Xray Chest 1 View- PORTABLE-Urgent (01.17.19 @ 06:48) >    FINDINGS:  Increased fine reticular opacities likely pulmonary edema. No focal   consolidations. No pleural effusion or pneumothorax. Cardiac silhouette   is not easily evaluated on this view but appears unchanged. Degenerative   disease of the thoracic spine.    IMPRESSION:   Mild interstitial edema.    < end of copied text >  OTHER: 	  	  LABS:	 	    CARDIAC MARKERS:                                  8.0    7.82  )-----------( 241      ( 17 Jan 2019 09:20 )             25.6     01-17    134<L>  |  96<L>  |  36<H>  ----------------------------<  92  4.1   |  26  |  1.68<H>    Ca    8.6      17 Jan 2019 06:10    TPro  6.4  /  Alb  2.9<L>  /  TBili  0.6  /  DBili  x   /  AST  15  /  ALT  10  /  AlkPhos  115  01-17    PT/INR - ( 17 Jan 2019 06:10 )   PT: 36.3 SEC;   INR: 3.08          PTT - ( 17 Jan 2019 06:10 )  PTT:121.0 SEC  proBNP: Serum Pro-Brain Natriuretic Peptide: 30845 pg/mL (01-17 @ 09:20)    Lipid Profile:   HgA1c:   TSH:

## 2025-04-21 ENCOUNTER — ROUTINE PRENATAL (OUTPATIENT)
Dept: OBGYN | Age: 25
End: 2025-04-21
Payer: COMMERCIAL

## 2025-04-21 VITALS
SYSTOLIC BLOOD PRESSURE: 118 MMHG | DIASTOLIC BLOOD PRESSURE: 67 MMHG | HEART RATE: 85 BPM | BODY MASS INDEX: 32.14 KG/M2 | WEIGHT: 172 LBS
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
## 2025-04-21 DIAGNOSIS — Z34.03 PRENATAL CARE, FIRST PREGNANCY IN THIRD TRIMESTER: Primary | ICD-10-CM

## 2025-04-21 DIAGNOSIS — Z3A.34 PREGNANCY WITH 34 COMPLETED WEEKS GESTATION: ICD-10-CM

## 2025-04-21 PROCEDURE — 1036F TOBACCO NON-USER: CPT | Performed by: ADVANCED PRACTICE MIDWIFE

## 2025-04-21 PROCEDURE — 59025 FETAL NON-STRESS TEST: CPT | Performed by: ADVANCED PRACTICE MIDWIFE

## 2025-04-21 PROCEDURE — G8427 DOCREV CUR MEDS BY ELIG CLIN: HCPCS | Performed by: ADVANCED PRACTICE MIDWIFE

## 2025-04-21 PROCEDURE — 99214 OFFICE O/P EST MOD 30 MIN: CPT | Performed by: ADVANCED PRACTICE MIDWIFE

## 2025-04-21 PROCEDURE — G8419 CALC BMI OUT NRM PARAM NOF/U: HCPCS | Performed by: ADVANCED PRACTICE MIDWIFE

## 2025-04-21 NOTE — PROGRESS NOTES
S: Presents for prenatal visit today. Reports baby is active and no contractions. Reports fell, taking groceries into home tripping on her pants. Reports hit her knees and hands, no impact to abdomen.   O:  MVSS, Afebrile. Abdomen gravid, nontender. S=D, +FHT's, +FM EFM: FHT's 125, moderate variability, accels present, decels absent, contractions absent. SVE deferred.   A:  23 yo  at 34 Weeks 2 Days SIUP, Category I FHR Tracing,  Fall  P:   Education: discussed and reviewed danger S&S, when to call and how to contact CNM. Reviewed FMI. RTO 2 weeks. Thirty minutes spent in education, evaluation and assessment.

## 2025-04-30 ENCOUNTER — OFFICE VISIT (OUTPATIENT)
Dept: PRIMARY CARE CLINIC | Age: 25
End: 2025-04-30
Payer: COMMERCIAL

## 2025-04-30 ENCOUNTER — RESULTS FOLLOW-UP (OUTPATIENT)
Dept: PRIMARY CARE CLINIC | Age: 25
End: 2025-04-30

## 2025-04-30 VITALS
SYSTOLIC BLOOD PRESSURE: 108 MMHG | BODY MASS INDEX: 32.39 KG/M2 | HEART RATE: 100 BPM | HEIGHT: 62 IN | DIASTOLIC BLOOD PRESSURE: 60 MMHG | RESPIRATION RATE: 18 BRPM | WEIGHT: 176 LBS | OXYGEN SATURATION: 97 % | TEMPERATURE: 98.9 F

## 2025-04-30 DIAGNOSIS — R05.1 ACUTE COUGH: Primary | ICD-10-CM

## 2025-04-30 PROCEDURE — G8419 CALC BMI OUT NRM PARAM NOF/U: HCPCS

## 2025-04-30 PROCEDURE — 99213 OFFICE O/P EST LOW 20 MIN: CPT

## 2025-04-30 PROCEDURE — 87428 SARSCOV & INF VIR A&B AG IA: CPT

## 2025-04-30 PROCEDURE — 1036F TOBACCO NON-USER: CPT

## 2025-04-30 PROCEDURE — G8427 DOCREV CUR MEDS BY ELIG CLIN: HCPCS

## 2025-04-30 PROCEDURE — 99212 OFFICE O/P EST SF 10 MIN: CPT

## 2025-04-30 PROCEDURE — PBSHW POCT COVID-19 & INFLUENZA A/B

## 2025-04-30 SDOH — ECONOMIC STABILITY: FOOD INSECURITY: WITHIN THE PAST 12 MONTHS, YOU WORRIED THAT YOUR FOOD WOULD RUN OUT BEFORE YOU GOT MONEY TO BUY MORE.: NEVER TRUE

## 2025-04-30 SDOH — ECONOMIC STABILITY: FOOD INSECURITY: WITHIN THE PAST 12 MONTHS, THE FOOD YOU BOUGHT JUST DIDN'T LAST AND YOU DIDN'T HAVE MONEY TO GET MORE.: NEVER TRUE

## 2025-04-30 ASSESSMENT — PATIENT HEALTH QUESTIONNAIRE - PHQ9
1. LITTLE INTEREST OR PLEASURE IN DOING THINGS: NOT AT ALL
SUM OF ALL RESPONSES TO PHQ QUESTIONS 1-9: 0
2. FEELING DOWN, DEPRESSED OR HOPELESS: NOT AT ALL
SUM OF ALL RESPONSES TO PHQ QUESTIONS 1-9: 0

## 2025-04-30 ASSESSMENT — ENCOUNTER SYMPTOMS
SORE THROAT: 1
WHEEZING: 1
RHINORRHEA: 1
COUGH: 1
SHORTNESS OF BREATH: 1

## 2025-04-30 NOTE — PROGRESS NOTES
[facility]  [address] [facility] [address]  Phone: [phone]  Fax: [phone]      Keenan Noyola  2000  MRN: [id number]  Date of visit: 4/30/2025    Chief Complaint:     Keenan Noyola is here for c/o of Cough (Cough, head congestion and fatigue x 4 days. EDC 5/31/25)      HPI:     Keenan Noyola is a 24 y.o. female who presents to the [facility] today for her medical conditions/complaints as noted below.    Cough  This is a new problem. Episode onset: 4 days. The problem has been gradually worsening. The problem occurs constantly. The cough is Non-productive. Associated symptoms include ear congestion, nasal congestion, rhinorrhea, a sore throat, shortness of breath and wheezing. Pertinent negatives include no chest pain, chills, ear pain, fever, headaches or myalgias. Nothing aggravates the symptoms. She has tried nothing for the symptoms. There is no history of asthma, COPD or environmental allergies.       Past Medical History:   Diagnosis Date    Anemia     with pregnancy    Anxiety     Depression     age 16.        Allergies   Allergen Reactions    Eql Liquid Hand Soap [Basis Cleanser]      Melon and cucumber     Pcn [Penicillins] Rash         Subjective:      Review of Systems   Constitutional:  Negative for chills and fever.   HENT:  Positive for rhinorrhea and sore throat. Negative for ear pain.    Respiratory:  Positive for cough, shortness of breath and wheezing.    Cardiovascular:  Negative for chest pain.   Musculoskeletal:  Negative for myalgias.   Allergic/Immunologic: Negative for environmental allergies.   Neurological:  Negative for headaches.       Objective:     Vitals:    04/30/25 1315   BP: 108/60   BP Site: Right Upper Arm   Patient Position: Sitting   BP Cuff Size: Medium Adult   Pulse: 100   Resp: 18   Temp: 98.9 °F (37.2 °C)   TempSrc: Tympanic   SpO2: 97%   Weight: 79.8 kg (176 lb)   Height: 1.575

## 2025-04-30 NOTE — PATIENT INSTRUCTIONS
Patient has signs and symptoms of upper respiratory infection / viral illness.   Antibiotics are not indicated at the present time.  May be treated with over the counter medications (pregnancy safe).   Patient can use Tylenol   Advised to follow up with family doctor or return to urgent care if does not get better or symptoms worsen.  Pt can go to ER if high fever >102 , vomiting, breathing difficulty, lethargy.   Patient/ parents understands this approach of home management and agrees with it.

## 2025-05-05 ENCOUNTER — ROUTINE PRENATAL (OUTPATIENT)
Dept: OBGYN | Age: 25
End: 2025-05-05
Payer: COMMERCIAL

## 2025-05-05 ENCOUNTER — HOSPITAL ENCOUNTER (OUTPATIENT)
Age: 25
Setting detail: SPECIMEN
Discharge: HOME | End: 2025-05-05
Payer: COMMERCIAL

## 2025-05-05 VITALS
OXYGEN SATURATION: 97 % | WEIGHT: 171.4 LBS | HEIGHT: 61 IN | BODY MASS INDEX: 32.36 KG/M2 | HEART RATE: 85 BPM | SYSTOLIC BLOOD PRESSURE: 117 MMHG | DIASTOLIC BLOOD PRESSURE: 64 MMHG

## 2025-05-05 DIAGNOSIS — Z3A.36 36 WEEKS GESTATION OF PREGNANCY: ICD-10-CM

## 2025-05-05 DIAGNOSIS — Z34.83 PRENATAL CARE, SUBSEQUENT PREGNANCY IN THIRD TRIMESTER: Primary | ICD-10-CM

## 2025-05-05 PROCEDURE — 87081 CULTURE SCREEN ONLY: CPT

## 2025-05-05 PROCEDURE — 99214 OFFICE O/P EST MOD 30 MIN: CPT | Performed by: ADVANCED PRACTICE MIDWIFE

## 2025-05-05 PROCEDURE — 99211 OFF/OP EST MAY X REQ PHY/QHP: CPT

## 2025-05-05 PROCEDURE — G8419 CALC BMI OUT NRM PARAM NOF/U: HCPCS | Performed by: ADVANCED PRACTICE MIDWIFE

## 2025-05-05 PROCEDURE — 1036F TOBACCO NON-USER: CPT | Performed by: ADVANCED PRACTICE MIDWIFE

## 2025-05-05 PROCEDURE — G8427 DOCREV CUR MEDS BY ELIG CLIN: HCPCS | Performed by: ADVANCED PRACTICE MIDWIFE

## 2025-05-05 NOTE — PROGRESS NOTES
S: Presents for prenatal visit today. Reports increasing discomforts with advancing pregnancy. Baby is active and occasional contractions. Reports no atypical discharge, bleeding or loss of fluid.   O:  MVSS, Afebrile. Abdomen gravid, nontender. S=D, +FHT's, +FM, Genital GBS culture obtained, SVE 1 cm/30%/-3 station  A:  23 yo  at 36 Weeks 2 Days sIUP, +FHT's  P:  Education: discussed and reviewed labor S&S, when to call and how to contact provider. Reviewed genital GBS culture. Antibiotics if positive in L&D. Thirty minutes spent in education, evaluation and assessment.

## 2025-05-08 ENCOUNTER — RESULTS FOLLOW-UP (OUTPATIENT)
Dept: OBGYN | Age: 25
End: 2025-05-08

## 2025-05-08 LAB
MICROORGANISM SPEC CULT: NORMAL
SERVICE CMNT-IMP: NORMAL
SPECIMEN DESCRIPTION: NORMAL

## 2025-05-12 ENCOUNTER — ROUTINE PRENATAL (OUTPATIENT)
Dept: OBGYN | Age: 25
End: 2025-05-12
Payer: COMMERCIAL

## 2025-05-12 VITALS
OXYGEN SATURATION: 98 % | SYSTOLIC BLOOD PRESSURE: 114 MMHG | HEIGHT: 61 IN | WEIGHT: 175.6 LBS | HEART RATE: 98 BPM | DIASTOLIC BLOOD PRESSURE: 64 MMHG | BODY MASS INDEX: 33.15 KG/M2

## 2025-05-12 DIAGNOSIS — Z34.83 PRENATAL CARE, SUBSEQUENT PREGNANCY IN THIRD TRIMESTER: Primary | ICD-10-CM

## 2025-05-12 DIAGNOSIS — Z3A.37 PREGNANCY WITH 37 WEEKS COMPLETED GESTATION: ICD-10-CM

## 2025-05-12 PROCEDURE — 1036F TOBACCO NON-USER: CPT | Performed by: ADVANCED PRACTICE MIDWIFE

## 2025-05-12 PROCEDURE — G8419 CALC BMI OUT NRM PARAM NOF/U: HCPCS | Performed by: ADVANCED PRACTICE MIDWIFE

## 2025-05-12 PROCEDURE — 99214 OFFICE O/P EST MOD 30 MIN: CPT | Performed by: ADVANCED PRACTICE MIDWIFE

## 2025-05-12 PROCEDURE — G8427 DOCREV CUR MEDS BY ELIG CLIN: HCPCS | Performed by: ADVANCED PRACTICE MIDWIFE

## 2025-05-12 PROCEDURE — 99211 OFF/OP EST MAY X REQ PHY/QHP: CPT

## 2025-05-12 NOTE — PROGRESS NOTES
S: Presents for prenatal visit today. Reports baby is active and occasional contractions. Reports no atypical discharge, bleeding or loss of fluid.   O:  MVSS, Afebrile. Abdomen gravid,  nontender. S=D, +FHT's, +FM, SVE Deferred  A:  25 yo  at 37 Weeks 2 Days sIUP, +FHT's  P:  Education: discussed and reviewed hydration, labor S&S, when to call and how to contact CNM. RTO 1 week. Thirty minutes spent in education, evaluation and assessment.

## 2025-05-22 ENCOUNTER — ROUTINE PRENATAL (OUTPATIENT)
Dept: OBGYN | Age: 25
End: 2025-05-22

## 2025-05-22 VITALS
HEART RATE: 96 BPM | DIASTOLIC BLOOD PRESSURE: 74 MMHG | WEIGHT: 179 LBS | BODY MASS INDEX: 33.82 KG/M2 | SYSTOLIC BLOOD PRESSURE: 118 MMHG

## 2025-05-22 DIAGNOSIS — Z3A.38 38 WEEKS GESTATION OF PREGNANCY: ICD-10-CM

## 2025-05-22 DIAGNOSIS — Z34.83 PRENATAL CARE, SUBSEQUENT PREGNANCY IN THIRD TRIMESTER: Primary | ICD-10-CM

## 2025-05-22 NOTE — PROGRESS NOTES
S: Presents for prenatal visit today. Reports baby is active and occasional contractions. Reports no atypical discharge, bleeding or loss of fluid.   O:  MVSS, Afebrile. Abdomen gravid, nontender. S=D, +FHT's, +FM, SVE 1 cm/50%/-3 station  A:  23 yo  at 38 Weeks 5 Days SIUP, +FHT's  P:  Education: discussed and reviewed labor S&S, when to call and how to contact CNM. Plans for spontaneous labor and epidural for pain management. RTO 1 week. Thirty minutes spent in education, evaluation and assessment.

## 2025-05-27 ENCOUNTER — ROUTINE PRENATAL (OUTPATIENT)
Dept: OBGYN | Age: 25
End: 2025-05-27
Payer: COMMERCIAL

## 2025-05-27 VITALS
HEART RATE: 80 BPM | SYSTOLIC BLOOD PRESSURE: 108 MMHG | DIASTOLIC BLOOD PRESSURE: 62 MMHG | BODY MASS INDEX: 33.82 KG/M2 | WEIGHT: 179 LBS

## 2025-05-27 DIAGNOSIS — Z3A.39 39 WEEKS GESTATION OF PREGNANCY: ICD-10-CM

## 2025-05-27 DIAGNOSIS — Z34.93 PRENATAL CARE, THIRD TRIMESTER: Primary | ICD-10-CM

## 2025-05-27 PROCEDURE — 99214 OFFICE O/P EST MOD 30 MIN: CPT | Performed by: ADVANCED PRACTICE MIDWIFE

## 2025-05-27 PROCEDURE — G8419 CALC BMI OUT NRM PARAM NOF/U: HCPCS | Performed by: ADVANCED PRACTICE MIDWIFE

## 2025-05-27 PROCEDURE — 1036F TOBACCO NON-USER: CPT | Performed by: ADVANCED PRACTICE MIDWIFE

## 2025-05-27 PROCEDURE — G8427 DOCREV CUR MEDS BY ELIG CLIN: HCPCS | Performed by: ADVANCED PRACTICE MIDWIFE

## 2025-05-27 NOTE — PROGRESS NOTES
S: Presents for prenatal visit today. Reports baby is active and note occasional contractions. No atypical discharge, bleeding or loss of fluid. Ready for baby at home.  O:  MVSS, Afebrile. Abdomen gravid, nontender. S=D, +FHT's, +FM, SVE 2-3 cm.50%-2 station  A:  25 yo  at 39 Weejs 3 Days SIUP, +FHT's  P:  Education: discussed and reviewed diet and hydration, reviewedlabor S&S, when to call and how to contact provider. RTO 1 week. Thirty minutes spent in education, evaluation and assessment.

## 2025-05-29 ENCOUNTER — ROUTINE PRENATAL (OUTPATIENT)
Dept: OBGYN | Age: 25
End: 2025-05-29
Payer: COMMERCIAL

## 2025-05-29 VITALS
WEIGHT: 176 LBS | DIASTOLIC BLOOD PRESSURE: 74 MMHG | HEART RATE: 75 BPM | SYSTOLIC BLOOD PRESSURE: 131 MMHG | BODY MASS INDEX: 33.25 KG/M2

## 2025-05-29 DIAGNOSIS — Z3A.39 39 WEEKS GESTATION OF PREGNANCY: ICD-10-CM

## 2025-05-29 DIAGNOSIS — Z34.93 PRENATAL CARE, THIRD TRIMESTER: Primary | ICD-10-CM

## 2025-05-29 DIAGNOSIS — O47.9 UTERINE CONTRACTIONS: ICD-10-CM

## 2025-05-29 PROCEDURE — G8427 DOCREV CUR MEDS BY ELIG CLIN: HCPCS | Performed by: ADVANCED PRACTICE MIDWIFE

## 2025-05-29 PROCEDURE — G8419 CALC BMI OUT NRM PARAM NOF/U: HCPCS | Performed by: ADVANCED PRACTICE MIDWIFE

## 2025-05-29 PROCEDURE — 1036F TOBACCO NON-USER: CPT | Performed by: ADVANCED PRACTICE MIDWIFE

## 2025-05-29 PROCEDURE — 59025 FETAL NON-STRESS TEST: CPT | Performed by: ADVANCED PRACTICE MIDWIFE

## 2025-05-29 PROCEDURE — 99214 OFFICE O/P EST MOD 30 MIN: CPT | Performed by: ADVANCED PRACTICE MIDWIFE

## 2025-05-29 NOTE — PROGRESS NOTES
S: Presents for prenatal visit today. Reports baby is active and increasing in uterine contractions starting 2:30. Denies bleeding or loss of fluid.   O:  MVSS, Afebrile. Abdomen gravid, nontender. S=D, EFM: , moderate variability, accels present, decels absent, contractions irregular. SVE 3 cm/50%/-3 station, soft  A:  23 yo  at 39 Weeks 5 Days SIUP, Category I FHR Tracing, Latent Uterine Contractions  P:  Edxpectant management. Reviewed labor S&S, when to call and how to contact CNM.

## 2025-05-29 NOTE — PROGRESS NOTES
Mild contractions as reported per patient started 2 am also reports losing her mucus plug at that time. No bleeding or other loss of fluids. Patient reports that contractions are a couple every 1-2 hours. Reports feeling baby movements per usual.

## 2025-06-17 ENCOUNTER — POSTPARTUM VISIT (OUTPATIENT)
Dept: OBGYN | Age: 25
End: 2025-06-17

## 2025-06-17 VITALS
DIASTOLIC BLOOD PRESSURE: 77 MMHG | BODY MASS INDEX: 29.1 KG/M2 | HEART RATE: 86 BPM | WEIGHT: 154 LBS | SYSTOLIC BLOOD PRESSURE: 117 MMHG

## 2025-06-17 DIAGNOSIS — Z30.09 FAMILY PLANNING EDUCATION, GUIDANCE, AND COUNSELING: ICD-10-CM

## 2025-06-18 ASSESSMENT — ENCOUNTER SYMPTOMS
GASTROINTESTINAL NEGATIVE: 1
EYES NEGATIVE: 1
RESPIRATORY NEGATIVE: 1

## 2025-06-18 NOTE — PROGRESS NOTES
Subjective:      Patient ID: Keenan Noyola  is a 24 y.o. y.o. female.    Keenan presents today for two week postpartum visit. She is exclusively breast feeding  her infant daughter every 1-2 hours during the daytime and 2-3 hours at night. She reports breast feeding is going well and she is nursing on each breast for 15-20 minutes at each feed. She is using a nipple shield and having no problems. She reports she is getting about 5-6 hours of sleep/24 hours. She reports some sadness, that she is taking so much time with baby and less time available for toddler and spouse. She denies depression and reports spouse is supportive and helping. She continues to bleed, requiring a maxi-pad change about every 3-4 hours and occasional small stringy clots.         Review of Systems   Constitutional: Negative.    HENT: Negative.     Eyes: Negative.    Respiratory: Negative.     Cardiovascular: Negative.    Gastrointestinal: Negative.    Genitourinary: Negative.    Musculoskeletal: Negative.    Skin: Negative.    Neurological: Negative.    Psychiatric/Behavioral: Negative.     Breast ROS: negative, Lactating    Objective:   /77   Pulse 86   Wt 69.9 kg (154 lb)   Breastfeeding Yes   BMI 29.10 kg/m²   Physical Exam  Constitutional:       Appearance: She is well-developed and normal weight.   HENT:      Head: Normocephalic and atraumatic.   Eyes:      Conjunctiva/sclera: Conjunctivae normal.   Cardiovascular:      Rate and Rhythm: Normal rate and regular rhythm.      Heart sounds: Normal heart sounds.   Pulmonary:      Effort: Pulmonary effort is normal.      Breath sounds: Normal breath sounds.   Chest:   Breasts:     Breasts are symmetrical.      Right: No inverted nipple, mass, nipple discharge, skin change or tenderness.      Left: No inverted nipple, mass, nipple discharge, skin change or tenderness.   Abdominal:      Palpations: Abdomen is soft.   Genitourinary:     Vagina: Normal.   Musculoskeletal:

## 2025-07-15 ENCOUNTER — POSTPARTUM VISIT (OUTPATIENT)
Dept: OBGYN | Age: 25
End: 2025-07-15

## 2025-07-15 VITALS
DIASTOLIC BLOOD PRESSURE: 66 MMHG | OXYGEN SATURATION: 97 % | BODY MASS INDEX: 27.8 KG/M2 | HEIGHT: 61 IN | SYSTOLIC BLOOD PRESSURE: 106 MMHG | WEIGHT: 147.25 LBS | HEART RATE: 102 BPM

## 2025-07-15 DIAGNOSIS — Z30.09 FAMILY PLANNING EDUCATION, GUIDANCE, AND COUNSELING: ICD-10-CM

## 2025-07-15 NOTE — PROGRESS NOTES
umbilicus  Perineum: is normal  Incision: none   Extremities:  no calf tenderness, non edematous    Assessment:  Keenan Noyola is a 24 y.o. female , 6 weeks Post Partum s/p on 25      - Stable, discontinue routine post partum care       Patient Active Problem List    Diagnosis Date Noted    COVID-19 affecting pregnancy in third trimester 2021     No follow-ups on file.    Counseling Completed:  Family planning counseling and STD counseling discussed  Discontinue with postpartum restrictions  Can resume heavy lifting and Chinle    ALESSANDRA Reaves CNM  [facility]   7/15/2025, 4:18 PM